# Patient Record
Sex: MALE | Race: WHITE | NOT HISPANIC OR LATINO | Employment: OTHER | URBAN - METROPOLITAN AREA
[De-identification: names, ages, dates, MRNs, and addresses within clinical notes are randomized per-mention and may not be internally consistent; named-entity substitution may affect disease eponyms.]

---

## 2017-10-26 DIAGNOSIS — E78.5 HYPERLIPIDEMIA LDL GOAL <130: ICD-10-CM

## 2017-10-31 RX ORDER — ATORVASTATIN CALCIUM 10 MG/1
TABLET, FILM COATED ORAL
Qty: 90 TABLET | Refills: 0 | OUTPATIENT
Start: 2017-10-31

## 2017-10-31 NOTE — TELEPHONE ENCOUNTER
Called pt-asked if he still comes to [facility], pt stated no he goes to a doc/clinic in [place].    Updated chart

## 2018-07-25 ENCOUNTER — OFFICE VISIT (OUTPATIENT)
Dept: UROLOGY | Facility: CLINIC | Age: 63
End: 2018-07-25
Payer: COMMERCIAL

## 2018-07-25 ENCOUNTER — MEDICAL CORRESPONDENCE (OUTPATIENT)
Dept: HEALTH INFORMATION MANAGEMENT | Facility: CLINIC | Age: 63
End: 2018-07-25

## 2018-07-25 VITALS
HEIGHT: 69 IN | WEIGHT: 200 LBS | DIASTOLIC BLOOD PRESSURE: 78 MMHG | BODY MASS INDEX: 29.62 KG/M2 | SYSTOLIC BLOOD PRESSURE: 141 MMHG

## 2018-07-25 DIAGNOSIS — N52.03 COMBINED ARTERIAL INSUFFICIENCY AND CORPORO-VENOUS OCCLUSIVE ERECTILE DYSFUNCTION: Primary | ICD-10-CM

## 2018-07-25 PROCEDURE — 99213 OFFICE O/P EST LOW 20 MIN: CPT | Performed by: UROLOGY

## 2018-07-25 ASSESSMENT — PAIN SCALES - GENERAL: PAINLEVEL: NO PAIN (0)

## 2018-07-25 NOTE — LETTER
7/25/2018       RE: Carlos Kingston  [address]  [facility] [address]     Dear Colleague,    Thank you for referring your patient, Carlos Kingston, to the [facility] [place] at [facility]. Please see a copy of my visit note below.    Carlos Kingston is a 63-year-old male with erectile dysfunction who I have seen several times in the past. It's been 2 years since I last saw him and he started injection therapy. He is managing with 0.4 cc injection and an erect aid ring to manage his venous leak.  We had a long discussion about penile prosthetics-Zenia's wife are not ready to consider this as an option  Other past medical history: No prostate cancer or family history of prostate cancer. No voiding issues. Arthritis, nonsmoker  Medications: Excedrin tension headache, Lipitor, Prilosec  Allergies: None  Exam: Alert and oriented, normocephalic, normal respirations, neuro grossly intact. Normal sphincter tone, no rectal mass or impaction, benign feeling prostate, normal seminal vesicles  Assessment: Erectile dysfunction  Plan: Renew tri-mix prescription            Yearly digital rectal exam             He may be a good candidate for penile implant in the future    Again, thank you for allowing me to participate in the care of your patient.      Sincerely,    Elliott Rocha MD

## 2018-07-25 NOTE — MR AVS SNAPSHOT
"              After Visit Summary   7/25/2018    Carlos Kingston    MRN: [id number]           Patient Information     Date Of Birth          1955        Visit Information        Provider Department      7/25/2018 9:30 AM Elliott Rocha MD [facility] [place]        Today's Diagnoses     Combined arterial insufficiency and corporo-venous occlusive erectile dysfunction    -  1       Follow-ups after your visit        Who to contact     If you have questions or need follow up information about today's clinic visit or your schedule please contact [facility] [place] directly at [phone].  Normal or non-critical lab and imaging results will be communicated to you by MyChart, letter or phone within 4 business days after the clinic has received the results. If you do not hear from us within 7 days, please contact the clinic through MyChart or phone. If you have a critical or abnormal lab result, we will notify you by phone as soon as possible.  Submit refill requests through [employer] or call your pharmacy and they will forward the refill request to us. Please allow 3 business days for your refill to be completed.          Additional Information About Your Visit        Care EveryWhere ID     This is your Care EveryWhere ID. This could be used by other organizations to access your [place] medical records  [id number]        Your Vitals Were     Height BMI (Body Mass Index)                1.753 m (5' 9\") 29.53 kg/m2           Blood Pressure from Last 3 Encounters:   07/25/18 141/78   12/02/16 139/83   10/21/16 107/75    Weight from Last 3 Encounters:   07/25/18 90.7 kg (200 lb)   10/21/16 87.1 kg (192 lb)   02/02/16 93.9 kg (207 lb)              Today, you had the following     No orders found for display       Primary Care Provider Fax #    Physician No Ref-Primary [phone]       No address on file        Equal Access to Services     DARRICK " GAAR : Hadii cr hassan sharda Hawkins, waaxda luqadaha, qaybta kaalmada dcabbiejesenia, morgan devries haylindsay caraballomaximwallace iqbal. So [facility] [phone].    ATENCIÓN: Si habla español, tiene a cardozo disposición servicios gratuitos de asistencia lingüística. Llame al [phone].    We comply with applicable federal civil rights laws and [place] laws. We do not discriminate on the basis of race, color, national origin, age, disability, sex, sexual orientation, or gender identity.            Thank you!     Thank you for choosing [facility] [place]  for your care. Our goal is always to provide you with excellent care. Hearing back from our patients is one way we can continue to improve our services. Please take a few minutes to complete the written survey that you may receive in the mail after your visit with us. Thank you!             Your Updated Medication List - Protect others around you: Learn how to safely use, store and throw away your medicines at www.disposemymeds.org.          This list is accurate as of 7/25/18  9:46 AM.  Always use your most recent med list.                   Brand Name Dispense Instructions for use Diagnosis    acetaminophen-caffeine 500-65 MG Tabs    EXCEDRIN TENSION HEADACHE     Take 2 tablets by mouth every 6 hours as needed for mild pain        atorvastatin 10 MG tablet    LIPITOR    90 tablet    Take 1 tablet (10 mg) by mouth daily    Hyperlipidemia LDL goal <130       omeprazole 20 MG CR capsule    priLOSEC    30 capsule    Take 1 capsule (20 mg) by mouth daily    Gastroesophageal reflux disease without esophagitis

## 2018-07-25 NOTE — PROGRESS NOTES
Carlos Kingston is a 63-year-old male with erectile dysfunction who I have seen several times in the past. It's been 2 years since I last saw him and he started injection therapy. He is managing with 0.4 cc injection and an erect aid ring to manage his venous leak.  We had a long discussion about penile prosthetics-Zenia's wife are not ready to consider this as an option  Other past medical history: No prostate cancer or family history of prostate cancer. No voiding issues. Arthritis, nonsmoker  Medications: Excedrin tension headache, Lipitor, Prilosec  Allergies: None  Exam: Alert and oriented, normocephalic, normal respirations, neuro grossly intact. Normal sphincter tone, no rectal mass or impaction, benign feeling prostate, normal seminal vesicles  Assessment: Erectile dysfunction  Plan: Renew tri-mix prescription            Yearly digital rectal exam             He may be a good candidate for penile implant in the future

## 2018-07-25 NOTE — LETTER
Date:July 26, 2018      Patient was self referred, no letter generated. Do not send.        [facility] Health Information

## 2019-04-08 ASSESSMENT — MIFFLIN-ST. JEOR: SCORE: 1600.47

## 2019-04-18 ENCOUNTER — HOSPITAL ENCOUNTER (OUTPATIENT)
Dept: LAB | Facility: CLINIC | Age: 64
Discharge: HOME | End: 2019-04-18
Attending: ORTHOPAEDIC SURGERY | Admitting: ORTHOPAEDIC SURGERY

## 2019-04-18 DIAGNOSIS — Z01.812 PRE-OPERATIVE LABORATORY EXAMINATION: ICD-10-CM

## 2019-04-18 LAB
MRSA DNA SPEC QL NAA+PROBE: POSITIVE
SPECIMEN SOURCE: ABNORMAL

## 2019-04-18 PROCEDURE — 40000829 ZZHCL STATISTIC STAPH AUREUS SUSCEPT SCREEN PCR: Performed by: ORTHOPAEDIC SURGERY

## 2019-04-18 PROCEDURE — 87641 MR-STAPH DNA AMP PROBE: CPT | Performed by: ORTHOPAEDIC SURGERY

## 2019-04-18 PROCEDURE — 87640 STAPH A DNA AMP PROBE: CPT | Performed by: ORTHOPAEDIC SURGERY

## 2019-04-18 PROCEDURE — 40000830 ZZHCL STATISTIC STAPH AUREUS METH RESIST SCREEN PCR: Performed by: ORTHOPAEDIC SURGERY

## 2019-04-19 RX ORDER — MUPIROCIN 20 MG/G
OINTMENT TOPICAL 2 TIMES DAILY
Status: ON HOLD | COMMUNITY
Start: 2019-04-29 | End: 2019-05-09

## 2019-04-19 RX ORDER — CEFAZOLIN SODIUM 1 G/3ML
1 INJECTION, POWDER, FOR SOLUTION INTRAMUSCULAR; INTRAVENOUS SEE ADMIN INSTRUCTIONS
Status: CANCELLED | OUTPATIENT
Start: 2019-04-19

## 2019-04-19 RX ORDER — CEFAZOLIN SODIUM 2 G/100ML
2 INJECTION, SOLUTION INTRAVENOUS
Status: CANCELLED | OUTPATIENT
Start: 2019-04-19

## 2019-04-19 NOTE — OR NURSING
Nasal screen MRSA positive.  Pt notified.  Rx for mupiricin called to pt's preferred pharmacy.  Reviewed instructions with pt for mupiricin as well as extra showers with exidine for 5 days prior to surgery.  Pt notified he will be in contact isolation while in hospital and receive Vanco preop.  Dr. Gonzalez's office notified of above.

## 2019-05-01 NOTE — PHARMACY-ADMISSION MEDICATION HISTORY
Medication reconciliation interview completed by pre-admitting nurse, reviewed by pharmacy. No further clarifications needed.     Last Reviewed by Maliha Mckenna RN on 4/8/2019     Prior to Admission medications    Medication Sig Last Dose Taking? Auth Provider   acetaminophen-caffeine (EXCEDRIN TENSION HEADACHE) 500-65 MG TABS Take 2 tablets by mouth every 6 hours as needed for mild pain  Yes Reported, Patient   atorvastatin (LIPITOR) 10 MG tablet Take 1 tablet (10 mg) by mouth daily  Yes Trudy King MD   mupirocin (BACTROBAN) 2 % external ointment 2 times daily Apply a small amount in each nostril 2 times per day for 7 days prior to surgery.  Yes Reported, Patient

## 2019-05-06 ENCOUNTER — APPOINTMENT (OUTPATIENT)
Dept: PHYSICAL THERAPY | Facility: CLINIC | Age: 64
DRG: 462 | End: 2019-05-06
Attending: ORTHOPAEDIC SURGERY
Payer: MEDICARE

## 2019-05-06 ENCOUNTER — ANESTHESIA EVENT (OUTPATIENT)
Dept: SURGERY | Facility: CLINIC | Age: 64
DRG: 462 | End: 2019-05-06
Payer: MEDICARE

## 2019-05-06 ENCOUNTER — HOSPITAL ENCOUNTER (INPATIENT)
Facility: CLINIC | Age: 64
LOS: 3 days | Discharge: HOME | DRG: 462 | End: 2019-05-09
Attending: ORTHOPAEDIC SURGERY | Admitting: ORTHOPAEDIC SURGERY
Payer: MEDICARE

## 2019-05-06 ENCOUNTER — APPOINTMENT (OUTPATIENT)
Dept: GENERAL RADIOLOGY | Facility: CLINIC | Age: 64
DRG: 462 | End: 2019-05-06
Attending: ORTHOPAEDIC SURGERY
Payer: MEDICARE

## 2019-05-06 ENCOUNTER — ANESTHESIA (OUTPATIENT)
Dept: SURGERY | Facility: CLINIC | Age: 64
DRG: 462 | End: 2019-05-06
Payer: MEDICARE

## 2019-05-06 DIAGNOSIS — Z96.653 STATUS POST TOTAL BILATERAL KNEE REPLACEMENT: Primary | ICD-10-CM

## 2019-05-06 PROCEDURE — 36000093 ZZH SURGERY LEVEL 4 1ST 30 MIN: Performed by: ORTHOPAEDIC SURGERY

## 2019-05-06 PROCEDURE — 25800030 ZZH RX IP 258 OP 636: Performed by: ORTHOPAEDIC SURGERY

## 2019-05-06 PROCEDURE — 27810169 ZZH OR IMPLANT GENERAL: Performed by: ORTHOPAEDIC SURGERY

## 2019-05-06 PROCEDURE — 37000008 ZZH ANESTHESIA TECHNICAL FEE, 1ST 30 MIN: Performed by: ORTHOPAEDIC SURGERY

## 2019-05-06 PROCEDURE — 0SRD0J9 REPLACEMENT OF LEFT KNEE JOINT WITH SYNTHETIC SUBSTITUTE, CEMENTED, OPEN APPROACH: ICD-10-PCS | Performed by: ORTHOPAEDIC SURGERY

## 2019-05-06 PROCEDURE — 97162 PT EVAL MOD COMPLEX 30 MIN: CPT | Mod: GP | Performed by: PHYSICAL THERAPIST

## 2019-05-06 PROCEDURE — 37000009 ZZH ANESTHESIA TECHNICAL FEE, EACH ADDTL 15 MIN: Performed by: ORTHOPAEDIC SURGERY

## 2019-05-06 PROCEDURE — 27210794 ZZH OR GENERAL SUPPLY STERILE: Performed by: ORTHOPAEDIC SURGERY

## 2019-05-06 PROCEDURE — C1713 ANCHOR/SCREW BN/BN,TIS/BN: HCPCS | Performed by: ORTHOPAEDIC SURGERY

## 2019-05-06 PROCEDURE — 25000128 H RX IP 250 OP 636: Performed by: ANESTHESIOLOGY

## 2019-05-06 PROCEDURE — 25000128 H RX IP 250 OP 636: Performed by: ORTHOPAEDIC SURGERY

## 2019-05-06 PROCEDURE — 25000132 ZZH RX MED GY IP 250 OP 250 PS 637: Performed by: ORTHOPAEDIC SURGERY

## 2019-05-06 PROCEDURE — A9270 NON-COVERED ITEM OR SERVICE: HCPCS | Performed by: ORTHOPAEDIC SURGERY

## 2019-05-06 PROCEDURE — C1776 JOINT DEVICE (IMPLANTABLE): HCPCS | Performed by: ORTHOPAEDIC SURGERY

## 2019-05-06 PROCEDURE — 97116 GAIT TRAINING THERAPY: CPT | Mod: GP | Performed by: PHYSICAL THERAPIST

## 2019-05-06 PROCEDURE — 25000125 ZZHC RX 250: Performed by: NURSE ANESTHETIST, CERTIFIED REGISTERED

## 2019-05-06 PROCEDURE — 97530 THERAPEUTIC ACTIVITIES: CPT | Mod: GP | Performed by: PHYSICAL THERAPIST

## 2019-05-06 PROCEDURE — 36000063 ZZH SURGERY LEVEL 4 EA 15 ADDTL MIN: Performed by: ORTHOPAEDIC SURGERY

## 2019-05-06 PROCEDURE — 25800030 ZZH RX IP 258 OP 636: Performed by: ANESTHESIOLOGY

## 2019-05-06 PROCEDURE — 71000014 ZZH RECOVERY PHASE 1 LEVEL 2 FIRST HR: Performed by: ORTHOPAEDIC SURGERY

## 2019-05-06 PROCEDURE — 12000000 ZZH R&B MED SURG/OB

## 2019-05-06 PROCEDURE — 0SRC0J9 REPLACEMENT OF RIGHT KNEE JOINT WITH SYNTHETIC SUBSTITUTE, CEMENTED, OPEN APPROACH: ICD-10-PCS | Performed by: ORTHOPAEDIC SURGERY

## 2019-05-06 PROCEDURE — 25800030 ZZH RX IP 258 OP 636: Performed by: NURSE ANESTHETIST, CERTIFIED REGISTERED

## 2019-05-06 PROCEDURE — 40000986 XR KNEE PORT BILATERAL 1/2 VW: Mod: 50

## 2019-05-06 PROCEDURE — 25000128 H RX IP 250 OP 636: Performed by: NURSE ANESTHETIST, CERTIFIED REGISTERED

## 2019-05-06 PROCEDURE — 40000306 ZZH STATISTIC PRE PROC ASSESS II: Performed by: ORTHOPAEDIC SURGERY

## 2019-05-06 PROCEDURE — 25800025 ZZH RX 258: Performed by: ORTHOPAEDIC SURGERY

## 2019-05-06 PROCEDURE — 97110 THERAPEUTIC EXERCISES: CPT | Mod: GP | Performed by: PHYSICAL THERAPIST

## 2019-05-06 DEVICE — BONE CEMENT STRK SIMPLEX P SPEEDSET 6192-1-001: Type: IMPLANTABLE DEVICE | Site: KNEE | Status: FUNCTIONAL

## 2019-05-06 DEVICE — IMP COMP PATELLA GENESIS II 9X35MM 71420578: Type: IMPLANTABLE DEVICE | Site: KNEE | Status: FUNCTIONAL

## 2019-05-06 DEVICE — IMP COMP TIBIAL TRAY SNN JOURNEY NP RT SZ 5 74022215: Type: IMPLANTABLE DEVICE | Site: KNEE | Status: FUNCTIONAL

## 2019-05-06 DEVICE — IMPLANTABLE DEVICE: Type: IMPLANTABLE DEVICE | Site: KNEE | Status: FUNCTIONAL

## 2019-05-06 RX ORDER — HYDROMORPHONE HYDROCHLORIDE 1 MG/ML
.3-.5 INJECTION, SOLUTION INTRAMUSCULAR; INTRAVENOUS; SUBCUTANEOUS
Status: DISCONTINUED | OUTPATIENT
Start: 2019-05-06 | End: 2019-05-09 | Stop reason: HOSPADM

## 2019-05-06 RX ORDER — GABAPENTIN 100 MG/1
100 CAPSULE ORAL 3 TIMES DAILY
Status: DISCONTINUED | OUTPATIENT
Start: 2019-05-06 | End: 2019-05-09 | Stop reason: HOSPADM

## 2019-05-06 RX ORDER — NALOXONE HYDROCHLORIDE 0.4 MG/ML
.1-.4 INJECTION, SOLUTION INTRAMUSCULAR; INTRAVENOUS; SUBCUTANEOUS
Status: DISCONTINUED | OUTPATIENT
Start: 2019-05-06 | End: 2019-05-09 | Stop reason: HOSPADM

## 2019-05-06 RX ORDER — CEFAZOLIN SODIUM 2 G/100ML
2 INJECTION, SOLUTION INTRAVENOUS
Status: COMPLETED | OUTPATIENT
Start: 2019-05-06 | End: 2019-05-06

## 2019-05-06 RX ORDER — HYDROMORPHONE HYDROCHLORIDE 1 MG/ML
.3-.5 INJECTION, SOLUTION INTRAMUSCULAR; INTRAVENOUS; SUBCUTANEOUS EVERY 5 MIN PRN
Status: DISCONTINUED | OUTPATIENT
Start: 2019-05-06 | End: 2019-05-06 | Stop reason: HOSPADM

## 2019-05-06 RX ORDER — PROPOFOL 10 MG/ML
INJECTION, EMULSION INTRAVENOUS PRN
Status: DISCONTINUED | OUTPATIENT
Start: 2019-05-06 | End: 2019-05-06

## 2019-05-06 RX ORDER — FENTANYL CITRATE 50 UG/ML
25-50 INJECTION, SOLUTION INTRAMUSCULAR; INTRAVENOUS
Status: DISCONTINUED | OUTPATIENT
Start: 2019-05-06 | End: 2019-05-06 | Stop reason: HOSPADM

## 2019-05-06 RX ORDER — ATORVASTATIN CALCIUM 10 MG/1
10 TABLET, FILM COATED ORAL DAILY
Status: DISCONTINUED | OUTPATIENT
Start: 2019-05-06 | End: 2019-05-09 | Stop reason: HOSPADM

## 2019-05-06 RX ORDER — NALOXONE HYDROCHLORIDE 0.4 MG/ML
.1-.4 INJECTION, SOLUTION INTRAMUSCULAR; INTRAVENOUS; SUBCUTANEOUS
Status: ACTIVE | OUTPATIENT
Start: 2019-05-06 | End: 2019-05-07

## 2019-05-06 RX ORDER — CEFAZOLIN SODIUM 2 G/100ML
2 INJECTION, SOLUTION INTRAVENOUS EVERY 8 HOURS
Status: COMPLETED | OUTPATIENT
Start: 2019-05-06 | End: 2019-05-07

## 2019-05-06 RX ORDER — CEFAZOLIN SODIUM 1 G/3ML
1 INJECTION, POWDER, FOR SOLUTION INTRAMUSCULAR; INTRAVENOUS SEE ADMIN INSTRUCTIONS
Status: DISCONTINUED | OUTPATIENT
Start: 2019-05-06 | End: 2019-05-06 | Stop reason: HOSPADM

## 2019-05-06 RX ORDER — METOCLOPRAMIDE HYDROCHLORIDE 5 MG/ML
5 INJECTION INTRAMUSCULAR; INTRAVENOUS EVERY 6 HOURS PRN
Status: DISCONTINUED | OUTPATIENT
Start: 2019-05-06 | End: 2019-05-09 | Stop reason: HOSPADM

## 2019-05-06 RX ORDER — FENTANYL CITRATE 50 UG/ML
INJECTION, SOLUTION INTRAMUSCULAR; INTRAVENOUS PRN
Status: DISCONTINUED | OUTPATIENT
Start: 2019-05-06 | End: 2019-05-06

## 2019-05-06 RX ORDER — ALBUTEROL SULFATE 0.83 MG/ML
2.5 SOLUTION RESPIRATORY (INHALATION) EVERY 4 HOURS PRN
Status: DISCONTINUED | OUTPATIENT
Start: 2019-05-06 | End: 2019-05-06 | Stop reason: HOSPADM

## 2019-05-06 RX ORDER — DIMENHYDRINATE 50 MG/ML
25 INJECTION, SOLUTION INTRAMUSCULAR; INTRAVENOUS
Status: DISCONTINUED | OUTPATIENT
Start: 2019-05-06 | End: 2019-05-06 | Stop reason: HOSPADM

## 2019-05-06 RX ORDER — LABETALOL 20 MG/4 ML (5 MG/ML) INTRAVENOUS SYRINGE
10
Status: DISCONTINUED | OUTPATIENT
Start: 2019-05-06 | End: 2019-05-06 | Stop reason: HOSPADM

## 2019-05-06 RX ORDER — LIDOCAINE 40 MG/G
CREAM TOPICAL
Status: DISCONTINUED | OUTPATIENT
Start: 2019-05-06 | End: 2019-05-09 | Stop reason: HOSPADM

## 2019-05-06 RX ORDER — EPHEDRINE SULFATE 50 MG/ML
INJECTION, SOLUTION INTRAMUSCULAR; INTRAVENOUS; SUBCUTANEOUS PRN
Status: DISCONTINUED | OUTPATIENT
Start: 2019-05-06 | End: 2019-05-06

## 2019-05-06 RX ORDER — PROPOFOL 10 MG/ML
INJECTION, EMULSION INTRAVENOUS CONTINUOUS PRN
Status: DISCONTINUED | OUTPATIENT
Start: 2019-05-06 | End: 2019-05-06

## 2019-05-06 RX ORDER — SODIUM CHLORIDE, SODIUM LACTATE, POTASSIUM CHLORIDE, CALCIUM CHLORIDE 600; 310; 30; 20 MG/100ML; MG/100ML; MG/100ML; MG/100ML
INJECTION, SOLUTION INTRAVENOUS CONTINUOUS
Status: DISCONTINUED | OUTPATIENT
Start: 2019-05-06 | End: 2019-05-06 | Stop reason: HOSPADM

## 2019-05-06 RX ORDER — ONDANSETRON 2 MG/ML
4 INJECTION INTRAMUSCULAR; INTRAVENOUS EVERY 6 HOURS PRN
Status: DISCONTINUED | OUTPATIENT
Start: 2019-05-06 | End: 2019-05-09 | Stop reason: HOSPADM

## 2019-05-06 RX ORDER — MORPHINE SULFATE 4 MG/ML
INJECTION, SOLUTION INTRAMUSCULAR; INTRAVENOUS PRN
Status: DISCONTINUED | OUTPATIENT
Start: 2019-05-06 | End: 2019-05-06

## 2019-05-06 RX ORDER — DIAZEPAM 10 MG/2ML
2.5 INJECTION, SOLUTION INTRAMUSCULAR; INTRAVENOUS
Status: DISCONTINUED | OUTPATIENT
Start: 2019-05-06 | End: 2019-05-06 | Stop reason: HOSPADM

## 2019-05-06 RX ORDER — AMOXICILLIN 250 MG
2 CAPSULE ORAL 2 TIMES DAILY
Status: DISCONTINUED | OUTPATIENT
Start: 2019-05-06 | End: 2019-05-09 | Stop reason: HOSPADM

## 2019-05-06 RX ORDER — ONDANSETRON 4 MG/1
4 TABLET, ORALLY DISINTEGRATING ORAL EVERY 6 HOURS PRN
Status: DISCONTINUED | OUTPATIENT
Start: 2019-05-06 | End: 2019-05-09 | Stop reason: HOSPADM

## 2019-05-06 RX ORDER — MEPERIDINE HYDROCHLORIDE 50 MG/ML
12.5 INJECTION INTRAMUSCULAR; INTRAVENOUS; SUBCUTANEOUS EVERY 5 MIN PRN
Status: DISCONTINUED | OUTPATIENT
Start: 2019-05-06 | End: 2019-05-06 | Stop reason: HOSPADM

## 2019-05-06 RX ORDER — ONDANSETRON 4 MG/1
4 TABLET, ORALLY DISINTEGRATING ORAL EVERY 30 MIN PRN
Status: DISCONTINUED | OUTPATIENT
Start: 2019-05-06 | End: 2019-05-06 | Stop reason: HOSPADM

## 2019-05-06 RX ORDER — OXYCODONE HYDROCHLORIDE 5 MG/1
5-10 TABLET ORAL EVERY 4 HOURS PRN
Status: DISCONTINUED | OUTPATIENT
Start: 2019-05-06 | End: 2019-05-09 | Stop reason: HOSPADM

## 2019-05-06 RX ORDER — ACETAMINOPHEN 325 MG/1
975 TABLET ORAL EVERY 8 HOURS
Status: DISCONTINUED | OUTPATIENT
Start: 2019-05-06 | End: 2019-05-09 | Stop reason: HOSPADM

## 2019-05-06 RX ORDER — ACETAMINOPHEN 325 MG/1
650 TABLET ORAL EVERY 4 HOURS PRN
Status: DISCONTINUED | OUTPATIENT
Start: 2019-05-09 | End: 2019-05-09 | Stop reason: HOSPADM

## 2019-05-06 RX ORDER — AMOXICILLIN 250 MG
1 CAPSULE ORAL 2 TIMES DAILY
Status: DISCONTINUED | OUTPATIENT
Start: 2019-05-06 | End: 2019-05-09 | Stop reason: HOSPADM

## 2019-05-06 RX ORDER — LIDOCAINE 40 MG/G
CREAM TOPICAL
Status: DISCONTINUED | OUTPATIENT
Start: 2019-05-06 | End: 2019-05-06 | Stop reason: HOSPADM

## 2019-05-06 RX ORDER — SODIUM CHLORIDE 9 MG/ML
INJECTION, SOLUTION INTRAVENOUS CONTINUOUS
Status: DISCONTINUED | OUTPATIENT
Start: 2019-05-06 | End: 2019-05-09 | Stop reason: HOSPADM

## 2019-05-06 RX ORDER — HYDRALAZINE HYDROCHLORIDE 20 MG/ML
2.5-5 INJECTION INTRAMUSCULAR; INTRAVENOUS EVERY 10 MIN PRN
Status: DISCONTINUED | OUTPATIENT
Start: 2019-05-06 | End: 2019-05-06 | Stop reason: HOSPADM

## 2019-05-06 RX ORDER — BUPIVACAINE HYDROCHLORIDE 7.5 MG/ML
INJECTION, SOLUTION INTRASPINAL PRN
Status: DISCONTINUED | OUTPATIENT
Start: 2019-05-06 | End: 2019-05-06

## 2019-05-06 RX ORDER — ONDANSETRON 2 MG/ML
4 INJECTION INTRAMUSCULAR; INTRAVENOUS EVERY 30 MIN PRN
Status: DISCONTINUED | OUTPATIENT
Start: 2019-05-06 | End: 2019-05-06 | Stop reason: HOSPADM

## 2019-05-06 RX ORDER — NALOXONE HYDROCHLORIDE 0.4 MG/ML
.1-.4 INJECTION, SOLUTION INTRAMUSCULAR; INTRAVENOUS; SUBCUTANEOUS
Status: DISCONTINUED | OUTPATIENT
Start: 2019-05-06 | End: 2019-05-06

## 2019-05-06 RX ADMIN — SODIUM CHLORIDE, POTASSIUM CHLORIDE, SODIUM LACTATE AND CALCIUM CHLORIDE: 600; 310; 30; 20 INJECTION, SOLUTION INTRAVENOUS at 07:34

## 2019-05-06 RX ADMIN — SODIUM CHLORIDE, POTASSIUM CHLORIDE, SODIUM LACTATE AND CALCIUM CHLORIDE: 600; 310; 30; 20 INJECTION, SOLUTION INTRAVENOUS at 07:53

## 2019-05-06 RX ADMIN — OXYCODONE HYDROCHLORIDE 10 MG: 5 TABLET ORAL at 17:03

## 2019-05-06 RX ADMIN — OXYCODONE HYDROCHLORIDE 10 MG: 5 TABLET ORAL at 20:45

## 2019-05-06 RX ADMIN — CEFAZOLIN SODIUM 2 G: 2 INJECTION, SOLUTION INTRAVENOUS at 07:34

## 2019-05-06 RX ADMIN — BUPIVACAINE HYDROCHLORIDE IN DEXTROSE 15 MG: 7.5 INJECTION, SOLUTION SUBARACHNOID at 07:04

## 2019-05-06 RX ADMIN — PROPOFOL 200 MG: 10 INJECTION, EMULSION INTRAVENOUS at 07:49

## 2019-05-06 RX ADMIN — CEFAZOLIN 1 G: 1 INJECTION, POWDER, FOR SOLUTION INTRAMUSCULAR; INTRAVENOUS at 09:34

## 2019-05-06 RX ADMIN — VANCOMYCIN HYDROCHLORIDE 1500 MG: 5 INJECTION, POWDER, LYOPHILIZED, FOR SOLUTION INTRAVENOUS at 06:28

## 2019-05-06 RX ADMIN — CEFAZOLIN SODIUM 2 G: 2 INJECTION, SOLUTION INTRAVENOUS at 17:05

## 2019-05-06 RX ADMIN — FENTANYL CITRATE 100 MCG: 50 INJECTION, SOLUTION INTRAMUSCULAR; INTRAVENOUS at 07:04

## 2019-05-06 RX ADMIN — PHENYLEPHRINE HYDROCHLORIDE 100 MCG: 10 INJECTION, SOLUTION INTRAMUSCULAR; INTRAVENOUS; SUBCUTANEOUS at 08:16

## 2019-05-06 RX ADMIN — MIDAZOLAM 2 MG: 1 INJECTION INTRAMUSCULAR; INTRAVENOUS at 07:04

## 2019-05-06 RX ADMIN — SODIUM CHLORIDE: 9 INJECTION, SOLUTION INTRAVENOUS at 11:38

## 2019-05-06 RX ADMIN — PHENYLEPHRINE HYDROCHLORIDE 100 MCG: 10 INJECTION, SOLUTION INTRAMUSCULAR; INTRAVENOUS; SUBCUTANEOUS at 09:10

## 2019-05-06 RX ADMIN — ONDANSETRON 4 MG: 2 INJECTION INTRAMUSCULAR; INTRAVENOUS at 11:38

## 2019-05-06 RX ADMIN — Medication 5 MG: at 07:43

## 2019-05-06 RX ADMIN — Medication 0.5 MG: at 14:20

## 2019-05-06 RX ADMIN — Medication 0.5 MG: at 12:26

## 2019-05-06 RX ADMIN — PROPOFOL 65 MCG/KG/MIN: 10 INJECTION, EMULSION INTRAVENOUS at 07:49

## 2019-05-06 RX ADMIN — MORPHINE SULFATE 0.4 MG: 4 INJECTION, SOLUTION INTRAMUSCULAR; INTRAVENOUS at 07:04

## 2019-05-06 RX ADMIN — GABAPENTIN 100 MG: 100 CAPSULE ORAL at 20:44

## 2019-05-06 RX ADMIN — SENNOSIDES AND DOCUSATE SODIUM 1 TABLET: 8.6; 5 TABLET ORAL at 20:44

## 2019-05-06 RX ADMIN — PROCHLORPERAZINE EDISYLATE 5 MG: 5 INJECTION INTRAMUSCULAR; INTRAVENOUS at 14:25

## 2019-05-06 RX ADMIN — ACETAMINOPHEN 975 MG: 325 TABLET, FILM COATED ORAL at 20:44

## 2019-05-06 ASSESSMENT — ACTIVITIES OF DAILY LIVING (ADL)
SWALLOWING: 0-->SWALLOWS FOODS/LIQUIDS WITHOUT DIFFICULTY
RETIRED_EATING: 0-->INDEPENDENT
AMBULATION: 0-->INDEPENDENT
COGNITION: 0 - NO COGNITION ISSUES REPORTED
FALL_HISTORY_WITHIN_LAST_SIX_MONTHS: NO
TOILETING: 0-->INDEPENDENT
TRANSFERRING: 0-->INDEPENDENT
DRESS: 0-->INDEPENDENT
ADLS_ACUITY_SCORE: 12
BATHING: 0-->INDEPENDENT
RETIRED_COMMUNICATION: 0-->UNDERSTANDS/COMMUNICATES WITHOUT DIFFICULTY
ADLS_ACUITY_SCORE: 10
ADLS_ACUITY_SCORE: 10

## 2019-05-06 ASSESSMENT — MIFFLIN-ST. JEOR: SCORE: 1600.47

## 2019-05-06 NOTE — BRIEF OP NOTE
[facility]    Brief Operative Note    Pre-operative diagnosis: DJD  Post-operative diagnosis * No post-op diagnosis entered *  Procedure: Procedure(s):  Bilateral total knee arthroplasty  Surgeon: Surgeon(s) and Role:     * Elliott Gonzalez MD - Primary     * David Martinez - Assisting  Anesthesia: General   Estimated blood loss: Less than 50 ml  Drains: None  Specimens: * No specimens in log *  Findings:   None.  Complications: None.  Implants:    Implant Name Type Inv. Item Serial No.  Lot No. LRB No. Used   BONE CEMENT RADIOPAQUE SIMPLEX P SPEEDSET 6192-1-001 Cement, Bone BONE CEMENT RADIOPAQUE SIMPLEX P SPEEDSET 6192-1-001  [employer] [id number] Right 2   BONE CEMENT RADIOPAQUE SIMPLEX P SPEEDSET 6192-1-001 Cement, Bone BONE CEMENT RADIOPAQUE SIMPLEX P SPEEDSET 6192-1-001  [employer] [id number] Left 2   JOURNEY  Tibial Baseplate, Size 5, Left    RG &amp; NEPHEW [id number] Left 1   IMP COMP PATELLA INOCENCIO II 9X35MM [id number] Total Joint Component/Insert IMP COMP PATELLA INOCENCIO II 9X35MM [id number]  [place]  [id number] Left 1   Size 5, Left, Bi-Cruciate Stabilized, Journey  II, BCS, Oxinium, Femoral Component    RG &amp; NEPHEW [id number] Left 1   Left, Size 5-6, 11MM, Journey II BCS A/P 52MM, M/L 74MM - Articular Insert    RG &amp; NEPHEW [id number] Left 1   IMP COMP PATELLA INOCENCIO II 9X35MM [id number] Total Joint Component/Insert IMP COMP PATELLA INOCENCIO II 9X35MM [id number]  [place]  [id number] Right 1   IMP COMP TIBIAL TRAY SNN JOURNEY NP RT SZ 5 Total Joint Component/Insert IMP COMP TIBIAL TRAY SNN JOURNEY NP RT SZ 5  [employer]  [id number] Right 1   Size 5, Right, Bi-Criciate Stabilized Journey II BCS Femoral Component    RG &amp; NEPHEW [id number] Right 1   Right, Size 5-6, 12MM A/P 52MM, M/L 74MM, Articular Insert    RG &amp; NEPHEW [id number] Right 1

## 2019-05-06 NOTE — ANESTHESIA PREPROCEDURE EVALUATION
Anesthesia Pre-Procedure Evaluation    Patient: Carlos Kingston   MRN: [id number] : 1955          Preoperative Diagnosis: DJD    Procedure(s):  Bilateral total knee arthroplasty    Past Medical History:   Diagnosis Date     Arthritis     generalized     Migraines      Past Surgical History:   Procedure Laterality Date     ANKLE SURGERY Right     Debridement     ARTHROPLASTY HIP Right 2014    Procedure: ARTHROPLASTY HIP;  Surgeon: Elliott Gonzalez MD;  Location: [facility] OR     ARTHROSCOPY KNEE Right      ARTHROSCOPY KNEE WITH MEDIAL MENISCECTOMY  2013    Procedure: ARTHROSCOPY KNEE WITH MEDIAL MENISCECTOMY;  RIGHT ARTHROSCOPY KNEE WITH MEDIAL MENISCECTOMY;  Surgeon: Elliott Gonzalez MD;  Location: [facility] OR     C TOTAL HIP ARTHROPLASTY Left      CARPAL TUNNEL RELEASE RT/LT Bilateral      DISCECTOMY, FUSION CERVICAL ANTERIOR TWO LEVELS, COMBINED N/A 2014    Procedure: COMBINED DISCECTOMY, FUSION CERVICAL ANTERIOR TWO LEVELS;  Surgeon: Mainor Adan MD;  Location: [facility] OR     ORTHOPEDIC SURGERY      right ankle arthroscopy     ORTHOPEDIC SURGERY      left hip replacment     ORTHOPEDIC SURGERY      bilateral carpal tunnel     SOFT TISSUE SURGERY      fatty lump removed rt. arm     Anesthesia Evaluation     . Pt has had prior anesthetic. Type: General    History of anesthetic complications   - PONV        ROS/MED HX    ENT/Pulmonary:  - neg pulmonary ROS     Neurologic:  - neg neurologic ROS     Cardiovascular:  - neg cardiovascular ROS   (+) ----. : . . . :. . Previous cardiac testing date:results:Stress Testdate: results:Interpretation Summary  The patient exhibited no chest pain during exercise.  The Duke treadmill score was low risk ( >5 Duke score).  The EKG portion of this stress test was negative for inducible ischemia (see  echo results below).  Target Heart Rate was achieved.  This was a normal stress echocardiogram with no evidence of stress-induced  ischemia. date: results: date:  results:          METS/Exercise Tolerance:     Hematologic:  - neg hematologic  ROS       Musculoskeletal:   (+) arthritis,  -       GI/Hepatic:  - neg GI/hepatic ROS       Renal/Genitourinary:  - ROS Renal section negative       Endo: Comment: Class 1 obesity    (+) Obesity, .      Psychiatric:  - neg psychiatric ROS       Infectious Disease:  - neg infectious disease ROS       Malignancy:      - no malignancy   Other:    - neg other ROS                      Physical Exam  Normal systems: cardiovascular, pulmonary and dental    Airway   Mallampati: II  TM distance: >3 FB  Neck ROM: full    Dental     Cardiovascular   Rhythm and rate: regular and normal      Pulmonary    breath sounds clear to auscultation    Other findings: Lab Test        12/31/14 12/30/14 03/29/12                       0633          0535          1355          WBC           --           --          7.8           HGB          11.3*        11.6*        14.9          MCV           --           --          89            PLT           --           --          278            Lab Test        12/01/16 02/02/16 12/31/14                                         1141          0633          NA            --          140           --           POTASSIUM    3.6          4.1           --           CHLORIDE      --          108           --           CO2           --          23            --           BUN           --          19            --           CR           1.06         1.00          --           ANIONGAP      --          9             --           VADIM           --          8.8           --           GLC          123*         93           112*                   ECG  NSR with late ant transition probably normal        Lab Results   Component Value Date    WBC 7.8 03/29/2012    HGB 11.3 (L) 12/31/2014    HCT 42.9 03/29/2012     03/29/2012    CRP <5.0 03/29/2012    SED 4 03/29/2012     02/02/2016    POTASSIUM 3.6 12/01/2016     "CHLORIDE 108 02/02/2016    CO2 23 02/02/2016    BUN 19 02/02/2016    CR 1.06 12/01/2016     (H) 12/01/2016    VADIM 8.8 02/02/2016    ALBUMIN 4.2 08/18/2016    PROTTOTAL 6.7 (L) 08/18/2016    ALT 44 08/18/2016    AST 25 08/18/2016    ALKPHOS 63 08/18/2016    BILITOTAL 0.8 08/18/2016       Preop Vitals  BP Readings from Last 3 Encounters:   05/06/19 (!) 145/94   07/25/18 141/78   12/02/16 139/83    Pulse Readings from Last 3 Encounters:   02/02/16 84      Resp Readings from Last 3 Encounters:   05/06/19 18   12/02/16 18   10/21/16 14    SpO2 Readings from Last 3 Encounters:   05/06/19 98%   12/02/16 93%   10/21/16 95%      Temp Readings from Last 1 Encounters:   05/06/19 97.7  F (36.5  C) (Temporal)    Ht Readings from Last 1 Encounters:   05/06/19 1.651 m (5' 5\")      Wt Readings from Last 1 Encounters:   05/06/19 89.4 kg (197 lb)    Estimated body mass index is 32.78 kg/m  as calculated from the following:    Height as of this encounter: 1.651 m (5' 5\").    Weight as of this encounter: 89.4 kg (197 lb).       Anesthesia Plan      History & Physical Review  History and physical reviewed and following examination; no interval change.    ASA Status:  2 .    NPO Status:  > 8 hours    Plan for General, Spinal and LMA with Intravenous induction. Maintenance will be Balanced.    PONV prophylaxis:  Ondansetron (or other 5HT-3) and Dexamethasone or Solumedrol       Postoperative Care  Postoperative pain management:  IV analgesics, Oral pain medications and Multi-modal analgesia.      Consents  Anesthetic plan, risks, benefits and alternatives discussed with:  Patient.  Use of blood products discussed: No .   .                 Jg Sung MD                    .  "

## 2019-05-06 NOTE — ANESTHESIA PROCEDURE NOTES
Peripheral nerve/Neuraxial procedure note : intrathecal  Pre-Procedure  Performed by Jg Sung MD  Referred by JIM  Location: pre-op      Pre-Anesthestic Checklist: patient identified, IV checked, site marked, risks and benefits discussed, informed consent, monitors and equipment checked, pre-op evaluation, at physician/surgeon's request and post-op pain management    Timeout  Correct Patient: Yes   Correct Procedure: Yes   Correct Site: Yes   Correct Laterality: Yes   Correct Position: Yes   Site Marked: Yes   .   Procedure Documentation    .    Procedure:    Intrathecal.  Insertion Site:L4-5  (midline approach)      Patient Prep;mask, sterile gloves, povidone-iodine 7.5% surgical scrub.  .  Needle: Quincke Spinal Needle (gauge): 22  Spinal/LP Needle Length (inches): 3.5 # of attempts: 1 and # of redirects:  No introducer used .       Assessment/Narrative  Paresthesias: No.  .  .  clear CSF fluid removed . Time Injected: 07:04

## 2019-05-06 NOTE — PLAN OF CARE
Discharge Planner PT PT: PT evaluation completion , treatment initiated for pt POD#0 s/p Bilat TKA due to OA.  Hx: Bilat NITHYA, and L RCT. pt lives w/ spouse in a house with 2 platform steps, once inside he has single level living. 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 No assistive device at baseline, Indep ADL/IADL, drives, active.     Patient plan for discharge: Spouse reports TCU may be needed.  Current status: very sleepy, pain 7/10 or greater. PROM: L knee 95, R knee 90. Indep SLR x 10 bilat, no KI Needed. Tolerated all TKA ex bilat, bed mobility min/mod A w/ HOB elevated, Min A transfers,  ambulated w/ fww no KI's a few paces alongside the bed, light headed /69, 02 sats 90% or greater on 4 L. Back to bed w/ mod A, alarm on, ice packs both knees.   Barriers to return to prior living situation:pt needs to climb 2 platform steps and 1 threshold to get into the home, if they go home , spouse will have to mange the pt on Platform step to/full range of motion OP PT  Recommendations for discharge: TCU at time of eval is recommended. As pt progresses, we will update any changes.  Rationale for recommendations: Pt is a bilat TKA, he has stairs at home. He will benefit form skilled PT at next level of care to progress ROM/strength/functional mobility.        Entered by: Chrissie Alejandre 05/06/2019 5:21 PM

## 2019-05-06 NOTE — OP NOTE
Procedure Date: 05/06/2019      DATE OF PROCEDURE:  05/06/2019      PREOPERATIVE DIAGNOSES:   1.  Degenerative joint disease, left knee.   2.  Degenerative joint disease, right knee.      PREOPERATIVE DIAGNOSES:   1.  Degenerative joint disease, left knee.   2.  Degenerative joint disease, right knee.      PROCEDURES:   1.  Left total knee arthroplasty.   2.  Right total knee arthroplasty.      ANESTHESIA:  General.      SURGEON:  Elliott Gonzalez MD      FIRST ASSISTANT:  David Martinez PA-C.        INDICATIONS:  This 64-year-old man has had chronic increasing problems with bilateral knee pain, and has failed a nonoperative treatment program.  Appropriate risks and alternatives were discussed at length, and he has elected to proceed with surgical intervention.      DESCRIPTION OF PROCEDURE:  The patient was brought to the operating room, given a general anesthetic, both knees prepped and draped sterilely in the usual manner.  We began with the left knee.  Under tourniquet control, we made our standard anterior medial incision.  Dissection was carried down sharply to the fascia.  Medial parapatellar arthrotomy was done.  Patella was slid laterally and the knee flexed to 90 degrees.  Inspection of the joint revealed complete loss of articular cartilage, tricompartmental.      We used the intramedullary guide system and the gap  to osteotomize first the femur and then the tibia, in 5 degrees of valgus to accept #5 size components, posterior stabilized.  Patella was cut flush and patellar thickness was recreated with the use of a caliper, 35 mm patella was chosen.  Trial reduction was done.  With an 11 mm thick spacer, we had excellent realignment, excellent ligament balance throughout the range of motion, and excellent patellar tracking.      Trial components were removed.  Bony surfaces were water picked with antibiotic solution.  Real components cemented into place, tibia first, followed by femur, and  patella.  When the cement had hardened, excess posterior cement was trimmed away.  We again trialed the tray, and again chose an 11 mm thick poly component.  This was snapped onto the tibial tray.  Final range of motion, patellar tracking was again excellent.      Wound was irrigated copiously with antibiotic solution, hemostasis obtained by electrocautery.  Closure done in layers, closing the fascia with #1 Vicryl, subcutaneous tissue with 2-0 Vicryl, staples on the skin.  Sterile compressive dressing applied.      We then turned our attention to the right knee.  Under tourniquet control, our standard anterior medial incision was made, dissection carried down to the fascia.  Medial parapatellar arthrotomy was done.  Patella slid laterally and the knee flexed to 90 degrees.  Inspection of joint revealed complete loss of articular cartilage, tricompartmental.      We used the intramedullary guide system to osteotomize first the femur and the tibia in 5 degrees of valgus to accept a #5 size components, posterior stabilized.  Patella was cut flush and patellar thickness was recreated with use of a caliper, 35 mm patella was chosen.  Trial reduction was done and with a 12 mm thick spacer, we had excellent ligament balance, excellent alignment, and excellent patellar tracking throughout the range of motion.      Trial components were removed.  Bony surfaces were water picked with antibiotic solution.  Real components cemented into place, tibia first, followed by femur and patella.  When the cement had hardened, excess posterior cement was trimmed away.  We again trialed the tray and again chose a 12 mm thick poly component.  This was snapped on the tibial tray.  Final range of motion and patellar tracking was again excellent.      Wound was irrigated copiously with antibiotic solution and hemostasis obtained by electrocautery.  Closure done in layers, closing the fascia with #1 Vicryl and subcutaneous tissue with 2-0  Vicryl, staples on the skin.  Sterile compressive dressing applied.  The patient awakened and taken to the recovery room in good condition.  There were no intraoperative complications and minimal blood loss.         TYLER FERNANDEZ MD             D: 2019   T: 2019   MT: DESIREE      Name:     ANTONY PERRY   MRN:      [id number]        Account:        [id number]   :      1955           Procedure Date: 2019      Document: [id number]       cc: Tyler Fernandez MD

## 2019-05-06 NOTE — PROGRESS NOTES
05/06/19 1600   Quick Adds   Type of Visit Initial PT Evaluation   Living Environment   Lives With spouse   Living Arrangements house  ([facility])   Transportation Anticipated family or friend will provide   Living Environment Comment pt lives w/ spouse in a house with 2 platform steps, once inside he has single level living.    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    Functional Level Prior   Ambulation 0-->independent   Transferring 0-->independent   Toileting 0-->independent   Bathing 0-->independent   Communication 0-->understands/communicates without difficulty   Swallowing 0-->swallows foods/liquids without difficulty   Cognition 0 - no cognition issues reported   Fall history within last six months no   Prior Functional Level Comment Indep, active   General Information   Onset of Illness/Injury or Date of Surgery - Date 05/06/19   Referring Physician Lisa   Patient/Family Goals Statement Probably TCU at [place]   Pertinent History of Current Problem (include personal factors and/or comorbidities that impact the POC) POD#0 Bilat TKA, Hx: both NITHYA, L RCT   Precautions/Limitations oxygen therapy device and L/min  (4 L)   Weight-Bearing Status - LLE weight-bearing as tolerated   Weight-Bearing Status - RLE weight-bearing as tolerated   General Info Comments spouse present and supportive   Cognitive Status Examination   Level of Consciousness lethargic/somnolent  (alert during activites, falls asleep quickly)   Follows Commands and Answers Questions 75% of the time;able to follow single-step instructions   Personal Safety and Judgment intact   Memory intact   Pain Assessment   Patient Currently in Pain Yes, see Vital Sign flowsheet  (7/10)   Integumentary/Edema   Integumentary/Edema  "Comments Bilat knees   Range of Motion (ROM)   ROM Comment 90 bilat   Strength   Strength Comments Indep SLR bilat   Bed Mobility   Bed Mobility Comments Min A x 2   Transfer Skills   Transfer Comments Min A x2 w/ fww   Gait   Gait Comments a couple steps alongside the bed w/ fww, CGA/cues   Balance   Balance Comments nees UE support of fww   Modality Interventions   Planned Modality Interventions Cryotherapy   General Therapy Interventions   Planned Therapy Interventions bed mobility training;gait training;ROM;strengthening;transfer training;progressive activity/exercise   Clinical Impression   Criteria for Skilled Therapeutic Intervention yes, treatment indicated   PT Diagnosis impaired gait and functional mobility   Influenced by the following impairments pain, loss of ROM, loss of LE strength, fatigue, nausea   Functional limitations due to impairments bed mobility, transfers, gait   Clinical Presentation Evolving/Changing   Clinical Presentation Rationale very sleepy, nausea, pain levels fluctuating   Clinical Decision Making (Complexity) Moderate complexity   Therapy Frequency` 2 times/day   Predicted Duration of Therapy Intervention (days/wks) 3 days   Anticipated Equipment Needs at Discharge front wheeled walker   Anticipated Discharge Disposition Transitional Care Facility   Risk & Benefits of therapy have been explained Yes   Patient, Family & other staff in agreement with plan of care Yes   [facility] TM \"6 Clicks\"   2016, Trustees of [facility], under license to [employer].  All rights reserved.   6 Clicks Short Forms Basic Mobility Inpatient Short Form   [facility]  \"6 Clicks\" V.2 Basic Mobility Inpatient Short Form   1. Turning from your back to your side while in a flat bed without using bedrails? 3 - A Little   2. Moving from lying on your back to sitting on the side of a flat bed without using bedrails? 2 - A Lot   3. Moving to and from a bed to a chair " (including a wheelchair)? 2 - A Lot   4. Standing up from a chair using your arms (e.g., wheelchair, or bedside chair)? 3 - A Little   5. To walk in hospital room? 2 - A Lot   6. Climbing 3-5 steps with a railing? 1 - Total   Basic Mobility Raw Score (Score out of 24.Lower scores equate to lower levels of function) 13   Total Evaluation Time   Total Evaluation Time (Minutes) 30

## 2019-05-06 NOTE — ANESTHESIA POSTPROCEDURE EVALUATION
Patient: Carlos Kingston    Procedure(s):  Bilateral total knee arthroplasty    Diagnosis:DJD  Diagnosis Additional Information: End stage arthritis of both knees  Dr. Gonazlez    Anesthesia Type:  General, Spinal, LMA    Note:  Anesthesia Post Evaluation    Patient location during evaluation: PACU  Patient participation: Able to participate in evaluation but full recovery from regional anesthesia has not yet ocurrred but is anticipated to occur within 48 hours  Level of consciousness: awake  Pain management: adequate  Airway patency: patent  Cardiovascular status: acceptable  Respiratory status: acceptable  Hydration status: acceptable  PONV: none             Last vitals:  Vitals:    05/06/19 0715 05/06/19 0718 05/06/19 1014   BP: 121/70 112/65    Pulse:      Resp: 12 29 14   Temp:   96.6  F (35.9  C)   SpO2: 97% 95%          Electronically Signed By: Jg Sung MD  May 6, 2019  10:22 AM

## 2019-05-06 NOTE — PROGRESS NOTES
Arrived to room 623 from PACU at 1125 via cart, transferred to bed via hover mat without difficulty, alert and oriented x 4, oriented to room and call system, dressing CDI, CMS intact, IV patent and infusing, martinez patent, rates pain 0/10, reviewed welcome folder and pain/medication information packet with patient and wife. SCD's on BLE. Diego ice chips well. Frequent VS started. Will continue to monitor.

## 2019-05-06 NOTE — ANESTHESIA CARE TRANSFER NOTE
Patient: Carlos Kingston    Procedure(s):  Bilateral total knee arthroplasty    Diagnosis: DJD  Diagnosis Additional Information: End stage arthritis of both knees  Dr. Gonzalez    Anesthesia Type:   General, Spinal, LMA     Note:  Airway :Face Mask  Patient transferred to:PACU  Comments: To PACU, report to RN, oxygen per mask.      Vitals: (Last set prior to Anesthesia Care Transfer)    CRNA VITALS  5/6/2019 0940 - 5/6/2019 1021      5/6/2019             Resp Rate (observed):  8                Electronically Signed By: ANNETTE Mosher CRNA  May 6, 2019  10:21 AM

## 2019-05-07 ENCOUNTER — APPOINTMENT (OUTPATIENT)
Dept: PHYSICAL THERAPY | Facility: CLINIC | Age: 64
DRG: 462 | End: 2019-05-07
Attending: ORTHOPAEDIC SURGERY
Payer: MEDICARE

## 2019-05-07 LAB — HGB BLD-MCNC: 11.7 G/DL (ref 13.3–17.7)

## 2019-05-07 PROCEDURE — 97110 THERAPEUTIC EXERCISES: CPT | Mod: GP | Performed by: PHYSICAL THERAPIST

## 2019-05-07 PROCEDURE — 36415 COLL VENOUS BLD VENIPUNCTURE: CPT | Performed by: ORTHOPAEDIC SURGERY

## 2019-05-07 PROCEDURE — 25800030 ZZH RX IP 258 OP 636: Performed by: ORTHOPAEDIC SURGERY

## 2019-05-07 PROCEDURE — 85018 HEMOGLOBIN: CPT | Performed by: ORTHOPAEDIC SURGERY

## 2019-05-07 PROCEDURE — 97530 THERAPEUTIC ACTIVITIES: CPT | Mod: GP | Performed by: PHYSICAL THERAPIST

## 2019-05-07 PROCEDURE — 97116 GAIT TRAINING THERAPY: CPT | Mod: GP | Performed by: PHYSICAL THERAPIST

## 2019-05-07 PROCEDURE — A9270 NON-COVERED ITEM OR SERVICE: HCPCS | Performed by: ORTHOPAEDIC SURGERY

## 2019-05-07 PROCEDURE — 25000132 ZZH RX MED GY IP 250 OP 250 PS 637: Performed by: ORTHOPAEDIC SURGERY

## 2019-05-07 PROCEDURE — 12000000 ZZH R&B MED SURG/OB

## 2019-05-07 PROCEDURE — 25000128 H RX IP 250 OP 636: Performed by: ORTHOPAEDIC SURGERY

## 2019-05-07 RX ADMIN — OXYCODONE HYDROCHLORIDE 10 MG: 5 TABLET ORAL at 11:32

## 2019-05-07 RX ADMIN — RIVAROXABAN 10 MG: 10 TABLET, FILM COATED ORAL at 07:42

## 2019-05-07 RX ADMIN — OXYCODONE HYDROCHLORIDE 10 MG: 5 TABLET ORAL at 18:45

## 2019-05-07 RX ADMIN — OXYCODONE HYDROCHLORIDE 10 MG: 5 TABLET ORAL at 22:31

## 2019-05-07 RX ADMIN — GABAPENTIN 100 MG: 100 CAPSULE ORAL at 20:27

## 2019-05-07 RX ADMIN — Medication 0.5 MG: at 13:57

## 2019-05-07 RX ADMIN — SENNOSIDES AND DOCUSATE SODIUM 2 TABLET: 8.6; 5 TABLET ORAL at 20:27

## 2019-05-07 RX ADMIN — Medication 0.5 MG: at 10:33

## 2019-05-07 RX ADMIN — SODIUM CHLORIDE: 9 INJECTION, SOLUTION INTRAVENOUS at 01:22

## 2019-05-07 RX ADMIN — OXYCODONE HYDROCHLORIDE 10 MG: 5 TABLET ORAL at 05:27

## 2019-05-07 RX ADMIN — CEFAZOLIN SODIUM 2 G: 2 INJECTION, SOLUTION INTRAVENOUS at 01:23

## 2019-05-07 RX ADMIN — ACETAMINOPHEN 975 MG: 325 TABLET, FILM COATED ORAL at 13:51

## 2019-05-07 RX ADMIN — ACETAMINOPHEN 975 MG: 325 TABLET, FILM COATED ORAL at 05:28

## 2019-05-07 RX ADMIN — PROCHLORPERAZINE EDISYLATE 5 MG: 5 INJECTION INTRAMUSCULAR; INTRAVENOUS at 01:53

## 2019-05-07 RX ADMIN — OXYCODONE HYDROCHLORIDE 10 MG: 5 TABLET ORAL at 01:19

## 2019-05-07 RX ADMIN — ACETAMINOPHEN 975 MG: 325 TABLET, FILM COATED ORAL at 22:31

## 2019-05-07 RX ADMIN — SENNOSIDES AND DOCUSATE SODIUM 1 TABLET: 8.6; 5 TABLET ORAL at 07:42

## 2019-05-07 RX ADMIN — OXYCODONE HYDROCHLORIDE 10 MG: 5 TABLET ORAL at 08:48

## 2019-05-07 RX ADMIN — GABAPENTIN 100 MG: 100 CAPSULE ORAL at 13:51

## 2019-05-07 RX ADMIN — ATORVASTATIN CALCIUM 10 MG: 10 TABLET, FILM COATED ORAL at 07:42

## 2019-05-07 RX ADMIN — OXYCODONE HYDROCHLORIDE 10 MG: 5 TABLET ORAL at 15:15

## 2019-05-07 RX ADMIN — GABAPENTIN 100 MG: 100 CAPSULE ORAL at 07:42

## 2019-05-07 ASSESSMENT — ACTIVITIES OF DAILY LIVING (ADL)
ADLS_ACUITY_SCORE: 12

## 2019-05-07 NOTE — PLAN OF CARE
A&Ox4, VSS: on capno with 4L oxygen nc. Slept between cares. Had episode of nausea after getting pain medication, compazine given with relief. CMS: intact. Drsg: CDI. Pain managed with Scheduled Tylenol and PRN Oxycodone. Jeong patent and stays on till Physical therapy sees him per pt. Request. Nursing continue to monitor.

## 2019-05-07 NOTE — PROGRESS NOTES
.Discharge Planner   Discharge Plans in progress: rehab facility   Barriers to discharge plan: assessments and bed availability  Follow up plan: will await further MD determination of discharge plan, facility availability, continue planning accordingly.        Entered by: BRANDON Lafleur 05/07/2019 11:34 AM

## 2019-05-07 NOTE — PLAN OF CARE
Discharge Planner OT   Patient plan for discharge: TCU, ortho MD and PANCHO state TCU  Current status: OT order received, chart reviewed. Per chart review and discussion with PT, pt currently requiring increased assist for all transfers/mobility tasks due to pain. Pt requiring assist for all ADLS at this time. Pt prefers to discharge to TCU. Pt will benefit from skilled OT services, most appropriate to initiate at next level of care.   Barriers to return to prior living situation: Refer to PT  Recommendations for discharge: TCU - defer OT eval to TCU  Rationale for recommendations: Pt with planned discharge to TCU. Recommend OT eval in TCU setting. Will complete IP OT order. PT to continue following for mobility.        Entered by: Afia Ruth 05/07/2019 3:23 PM

## 2019-05-07 NOTE — PLAN OF CARE
Discharge Planner PT   Patient plan for discharge: Pt desires TCU  Current status: Increased pain today, R knee 8/10, L knee 9/10 w/ gait and ex. Participates in all TKA ex, needs A with L SLR and SAQ today more so due to high pain than true weakness. ROM L 95, R 90. Min A bed mobility, Min A>CGA x2 sit<>stand w/ fww, No KI needed. Gait w/ fww 20' x1 with high pain levels and light headedness as limiting factors. /75. In wc, recommended 30 min, nurse aware.   Barriers to return to prior living situation: Stairs, needs A with all mobility, limited gait tolerance, pain levels high  Recommendations for discharge: TCU  Rationale for recommendations: Level of A needed, will benefit from skilled PT and nursing assist upon hospital discharge to progress ROM, strength, gait, and pain management.        Entered by: Chrissie Alejandre 05/07/2019 10:12 AM

## 2019-05-07 NOTE — CONSULTS
.Care Transition Initial Assessment - SW  Discharge planning: Consult received for discharge planning in anticipation of pt's transfer to rehab facility on discharge. Noted PT assessment and recommendation for TCU on discharge.   Met with: Patient    Active Problems:    Status post total bilateral knee replacement       DATA  Lives With: spouse   Living Arrangements: house([facility])  Quality of Family Relationships: involved, supportive  Description of Support System: Supportive, Involved  Who is your support system?: Wife  Support Assessment: Adequate family and caregiver support.   Resources List: Skilled Nursing Facility     Quality of Family Relationships: involved, supportive  Transportation Anticipated: (to be determined)     INTERVENTION     Met with pt who affirms planning for his transfer to rehab facility on discharge. Pt was provided SNF Planning guide which includes Medicare/SNF coverage information as well as SNF listing. Pt identifies [facility] and [facility] in [place] as facilities of preference due to proximity to his home ( [place]). Other facilities of consideration would be [facility] and [facility], referrals made. Pt has not yet determined private or semi-private room, discussed with him that he would be responsible for the private room fees which he acknowledged.      Addressed additional questions of pt including determining factors for rehab facility length on stay. Pt noted that he will most likely have his wife provide the transportation for him.. it was noted of the availability of medical transport if needed, discussed also the cost ($75/base and $5/mi) which would not be covered by his Medicare insurance.     ASSESSMENT  Cognitive Status:  alert and oriented   PLAN    Patient given options and choices for discharge: Yes   Patient/family is agreeable to the plan?  Yes  Patient Goals and Preferences: independence with ambulation and ADLs  Patient anticipates  discharging to:  Rehab facility     Will await facility assessments and availability, MD determination of discharge plan, continue planning accordingly.     Addendum:       D: Noted per MD documentation the anticipation of pt's transfer to rehab facility in 2 days ( Thursday). [facility] has assessed and would [facility] to accept pt on Thursday, private room with no cost to pt for the private room. Met with pt and discussed, he has asked that planning continue for his transfer there. SW will follow-up with facility tomorrow to determine what time on Thursday the bed would be available, will discuss then with pt for finalization of arrangements.

## 2019-05-07 NOTE — PLAN OF CARE
Afeb, vss, cms intact, ace wraps clean and dry, martinez patent with large urine output, discontinued before pm therapy session since pt did tolerate getting up and ambulating about 20 feet and sat in w/c for 30min. Pain mod to severe, treated with both po oxycodone and IV Dilaudid for breakthru pain. Up with walker and assist of 1-2, no immobilizer needed.  Pt plans on going to TCU on discharge, already set up by [facility].

## 2019-05-07 NOTE — PROGRESS NOTES
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 Please contact Infection Prevention with any questions/concerns at *[phone].    Nini Claudio, ICP

## 2019-05-07 NOTE — PROGRESS NOTES
Carlos Kingston  5/7/2019  POD # 1  Subjective:     Doing well.  No immediate surgical complications identified.  Pain well-controlled.  Objective:    Exam:  CMS: intact  alert, stable, wound ok        PLAN: Start physical therapy  Discharge plan: Rehab transfer in 2 days.

## 2019-05-07 NOTE — PLAN OF CARE
Vital signs stable.On Capnography, O2 at 4 lpm nasal cannula  sats 90's.  Lungs clear, encouraged inspirometer use.  Bowel sounds hypoactive, nausea, emesis x1 tonight,no request for antinausea meds.  Dressing intact to bilateral knees, cms intact.  Sat up at bedside, stood ,using walker, gait belt and assist of 2.  Pain controlled with ice pack application, tylenol, oxycodone.  Post op plan of care reviewed with pt.

## 2019-05-08 ENCOUNTER — APPOINTMENT (OUTPATIENT)
Dept: PHYSICAL THERAPY | Facility: CLINIC | Age: 64
DRG: 462 | End: 2019-05-08
Attending: ORTHOPAEDIC SURGERY
Payer: MEDICARE

## 2019-05-08 LAB — HGB BLD-MCNC: 11.6 G/DL (ref 13.3–17.7)

## 2019-05-08 PROCEDURE — 12000000 ZZH R&B MED SURG/OB

## 2019-05-08 PROCEDURE — 97110 THERAPEUTIC EXERCISES: CPT | Mod: GP | Performed by: PHYSICAL THERAPY ASSISTANT

## 2019-05-08 PROCEDURE — 25000132 ZZH RX MED GY IP 250 OP 250 PS 637: Performed by: INTERNAL MEDICINE

## 2019-05-08 PROCEDURE — 97116 GAIT TRAINING THERAPY: CPT | Mod: GP | Performed by: PHYSICAL THERAPY ASSISTANT

## 2019-05-08 PROCEDURE — 97530 THERAPEUTIC ACTIVITIES: CPT | Mod: GP | Performed by: PHYSICAL THERAPY ASSISTANT

## 2019-05-08 PROCEDURE — 36415 COLL VENOUS BLD VENIPUNCTURE: CPT | Performed by: ORTHOPAEDIC SURGERY

## 2019-05-08 PROCEDURE — 25000132 ZZH RX MED GY IP 250 OP 250 PS 637: Performed by: ORTHOPAEDIC SURGERY

## 2019-05-08 PROCEDURE — A9270 NON-COVERED ITEM OR SERVICE: HCPCS | Performed by: ORTHOPAEDIC SURGERY

## 2019-05-08 PROCEDURE — A9270 NON-COVERED ITEM OR SERVICE: HCPCS | Performed by: INTERNAL MEDICINE

## 2019-05-08 PROCEDURE — 25000128 H RX IP 250 OP 636: Performed by: ORTHOPAEDIC SURGERY

## 2019-05-08 PROCEDURE — 85018 HEMOGLOBIN: CPT | Performed by: ORTHOPAEDIC SURGERY

## 2019-05-08 RX ORDER — OXYCODONE HYDROCHLORIDE 5 MG/1
5-10 TABLET ORAL EVERY 4 HOURS PRN
Qty: 40 TABLET | Refills: 0 | Status: SHIPPED | OUTPATIENT
Start: 2019-05-08 | End: 2020-07-31

## 2019-05-08 RX ORDER — HYDROXYZINE HYDROCHLORIDE 25 MG/1
25 TABLET, FILM COATED ORAL EVERY 6 HOURS PRN
Status: DISCONTINUED | OUTPATIENT
Start: 2019-05-08 | End: 2019-05-09 | Stop reason: HOSPADM

## 2019-05-08 RX ADMIN — ACETAMINOPHEN 975 MG: 325 TABLET, FILM COATED ORAL at 06:32

## 2019-05-08 RX ADMIN — SENNOSIDES AND DOCUSATE SODIUM 2 TABLET: 8.6; 5 TABLET ORAL at 07:59

## 2019-05-08 RX ADMIN — OXYCODONE HYDROCHLORIDE 10 MG: 5 TABLET ORAL at 10:12

## 2019-05-08 RX ADMIN — OXYCODONE HYDROCHLORIDE 10 MG: 5 TABLET ORAL at 14:10

## 2019-05-08 RX ADMIN — SENNOSIDES AND DOCUSATE SODIUM 2 TABLET: 8.6; 5 TABLET ORAL at 20:25

## 2019-05-08 RX ADMIN — ATORVASTATIN CALCIUM 10 MG: 10 TABLET, FILM COATED ORAL at 07:59

## 2019-05-08 RX ADMIN — OXYCODONE HYDROCHLORIDE 10 MG: 5 TABLET ORAL at 18:22

## 2019-05-08 RX ADMIN — RIVAROXABAN 10 MG: 10 TABLET, FILM COATED ORAL at 07:59

## 2019-05-08 RX ADMIN — Medication 0.5 MG: at 13:17

## 2019-05-08 RX ADMIN — Medication 0.5 MG: at 08:08

## 2019-05-08 RX ADMIN — OXYCODONE HYDROCHLORIDE 10 MG: 5 TABLET ORAL at 06:33

## 2019-05-08 RX ADMIN — OXYCODONE HYDROCHLORIDE 10 MG: 5 TABLET ORAL at 02:44

## 2019-05-08 RX ADMIN — GABAPENTIN 100 MG: 100 CAPSULE ORAL at 07:59

## 2019-05-08 RX ADMIN — GABAPENTIN 100 MG: 100 CAPSULE ORAL at 20:25

## 2019-05-08 RX ADMIN — ACETAMINOPHEN 975 MG: 325 TABLET, FILM COATED ORAL at 22:04

## 2019-05-08 RX ADMIN — OXYCODONE HYDROCHLORIDE 10 MG: 5 TABLET ORAL at 22:04

## 2019-05-08 RX ADMIN — ACETAMINOPHEN 975 MG: 325 TABLET, FILM COATED ORAL at 13:16

## 2019-05-08 RX ADMIN — Medication 0.5 MG: at 01:33

## 2019-05-08 RX ADMIN — HYDROXYZINE HYDROCHLORIDE 25 MG: 25 TABLET ORAL at 20:25

## 2019-05-08 RX ADMIN — GABAPENTIN 100 MG: 100 CAPSULE ORAL at 13:16

## 2019-05-08 ASSESSMENT — ACTIVITIES OF DAILY LIVING (ADL)
ADLS_ACUITY_SCORE: 12

## 2019-05-08 NOTE — PLAN OF CARE
Vital signs, low grade fever at hs,.  Lungs clear, encouraged inspirometer use when awake,  Bowel sounds hypoactive, passing flatus, voiding.  CMS intact, dressing to bilateral knees intact.  Ambulated  to bathroom with assist of 1 using walker, gait belt, pt got tired and painful when up to bathroom , refused to ambulate  in [place] tonight.Sat up and stood at bedside.  Pain controlled with ice pack application, tylenol, oxycodone.  Hgb 11.7  Post op plan of care reviewed with pt and spouse.

## 2019-05-08 NOTE — PLAN OF CARE
A&OX4, VSS: low grade fever. CMS: intact. Drsg: cdi. Pain controled with PRN Oxycodone q4h and Scheduled Tylenol. Voiding adequately;ly per urinal.  IS and fluids encouraged. Nursing continue to monitor.

## 2019-05-08 NOTE — PROGRESS NOTES
Your information has been submitted on May 08th, 2019 at 11:59:33 AM CDT. The confirmation number is [id number]

## 2019-05-08 NOTE — PROGRESS NOTES
VERN     D: Discharge planning continuing.. per discussion with MD pt will be ready for discharge to TCU tomorrow. PANCHO has spoken this morning to admissions coordinator at [facility] affirming bed availability for pt for tomorrow.       I: Met with pt discussed availability at [facility] for tomorrow, private room, no private room fees for pt. Pt will have his wife provide the transport, 1200 arranged.      A/P: Anticipate no problem with arrangements made for transfer tomorrow, available until discharge should adjustments be needed in plan as noted.

## 2019-05-08 NOTE — PLAN OF CARE
Low grade temps (99's) tachycardic 100-110's, BPs not as high as overnight. Pain less today, 4-5 mostly with it going to a 6 with therapy. Did use IV dilaudid for breakthrough before the therapy sessions but it did come out this pm. Voiding well since martinez out yesterday, passing flatus with good bowel sounds and no further nausea/vomiting. Ace wraps off and aquacels clean and dry. Not much swelling or bruising in the legs. Up with assist of 1 and walker. [facility] TCU tomorrow.

## 2019-05-08 NOTE — PLAN OF CARE
Discharge Planner PT   Patient plan for discharge: Pt desires TCU  Current status: PT - Pt transfers supine to sit with min assist with advancing B LEs to EOB. Required assist with advancing B LEs to the floor d/t increase pain with flexion.  Pt transfers sit to/from stand with min assist of 2.  Pt transfers bed to chair with ww with min assist of 1 and 1 following with ww.  Pt amb 35' with ww with min assist of 1 and 1 to following with ww.   Barriers to return to prior living situation: Stairs, needs A with all mobility, limited gait tolerance, pain levels high  Recommendations for discharge: TCU per plan established by the PT.  Rationale for recommendations: Level of A needed, will benefit from skilled PT and nursing assist upon hospital discharge to progress ROM, strength, gait, and pain management.        Entered by: Jo Dowell 05/08/2019 9:12 AM

## 2019-05-09 ENCOUNTER — APPOINTMENT (OUTPATIENT)
Dept: PHYSICAL THERAPY | Facility: CLINIC | Age: 64
DRG: 462 | End: 2019-05-09
Attending: ORTHOPAEDIC SURGERY
Payer: MEDICARE

## 2019-05-09 VITALS
RESPIRATION RATE: 18 BRPM | HEART RATE: 115 BPM | SYSTOLIC BLOOD PRESSURE: 145 MMHG | BODY MASS INDEX: 32.82 KG/M2 | OXYGEN SATURATION: 91 % | WEIGHT: 197 LBS | TEMPERATURE: 97.8 F | HEIGHT: 65 IN | DIASTOLIC BLOOD PRESSURE: 84 MMHG

## 2019-05-09 PROCEDURE — 25000132 ZZH RX MED GY IP 250 OP 250 PS 637: Performed by: INTERNAL MEDICINE

## 2019-05-09 PROCEDURE — A9270 NON-COVERED ITEM OR SERVICE: HCPCS | Performed by: ORTHOPAEDIC SURGERY

## 2019-05-09 PROCEDURE — 97110 THERAPEUTIC EXERCISES: CPT | Mod: GP | Performed by: PHYSICAL THERAPY ASSISTANT

## 2019-05-09 PROCEDURE — 97116 GAIT TRAINING THERAPY: CPT | Mod: GP | Performed by: PHYSICAL THERAPY ASSISTANT

## 2019-05-09 PROCEDURE — A9270 NON-COVERED ITEM OR SERVICE: HCPCS | Performed by: INTERNAL MEDICINE

## 2019-05-09 PROCEDURE — 25000132 ZZH RX MED GY IP 250 OP 250 PS 637: Performed by: ORTHOPAEDIC SURGERY

## 2019-05-09 RX ADMIN — ACETAMINOPHEN 975 MG: 325 TABLET, FILM COATED ORAL at 06:28

## 2019-05-09 RX ADMIN — OXYCODONE HYDROCHLORIDE 10 MG: 5 TABLET ORAL at 09:50

## 2019-05-09 RX ADMIN — HYDROXYZINE HYDROCHLORIDE 25 MG: 25 TABLET ORAL at 07:59

## 2019-05-09 RX ADMIN — OXYCODONE HYDROCHLORIDE 10 MG: 5 TABLET ORAL at 06:29

## 2019-05-09 RX ADMIN — GABAPENTIN 100 MG: 100 CAPSULE ORAL at 07:57

## 2019-05-09 RX ADMIN — ATORVASTATIN CALCIUM 10 MG: 10 TABLET, FILM COATED ORAL at 07:57

## 2019-05-09 RX ADMIN — OXYCODONE HYDROCHLORIDE 10 MG: 5 TABLET ORAL at 02:22

## 2019-05-09 RX ADMIN — SENNOSIDES AND DOCUSATE SODIUM 2 TABLET: 8.6; 5 TABLET ORAL at 07:57

## 2019-05-09 RX ADMIN — RIVAROXABAN 10 MG: 10 TABLET, FILM COATED ORAL at 07:57

## 2019-05-09 RX ADMIN — HYDROXYZINE HYDROCHLORIDE 25 MG: 25 TABLET ORAL at 02:22

## 2019-05-09 ASSESSMENT — ACTIVITIES OF DAILY LIVING (ADL)
ADLS_ACUITY_SCORE: 12

## 2019-05-09 NOTE — PLAN OF CARE
Discharge Planner PT   Patient plan for discharge: TCU  Current status: Pt transfers supine to sit IND. Partially met Pt transfers EOB to to RW with Mod-A  and elevated bed. After amb pt performed RW>chair with Min-A of 2 and vc for LLE placement.  Goal not met. Pt amb 60' with RW and MIn-A  not met.  [place] through R knee buckeled with slight LOB, but recovered with Min-A . Pt performed QS, HS, SAQ and LAQ x10 AROM. LLE was a bit more limited than RLE.   Stairs were not performed. Not met  Barriers to return to prior living situation:Stairs, A with transfers and amb, triceps and B LE fatigues quickly during gait.   Recommendations for discharge: TCU per plan established by the PT.  Rationale for recommendations: Pt discharging to TCU . Collaborated w/ PT- goals partially met. Please refer to discharge summary.     Entered by: Jo Dowell 05/09/2019 11:19 AM

## 2019-05-09 NOTE — DISCHARGE INSTRUCTIONS
Return to clinic in 10-14 days.  Call [phone] to schedule or if you experience any problems before your scheduled appointment.      1. Do exercises at home as instructed by therapist twice a day. Start outpatient therapy as ordered by your doctor if needed after you leave TCU..  2. Ice knee after exercises and therapy.  3. Examine dressing daily for problems.  4. May shower, can get dressing wet, no soaking (no bathtubs, pools or hot tubs)  5. Notify your dentist or physician of your implant so you can get antibiotics before any dental work or before any invasive procedure (ie: colonoscopy)    Aquacel dressing:  DO NOT CHANGE DRESSING DAILY.  Leave this dressing on until follow-up appointment with Orthopedic Surgeon.  Dressing is waterproof, can shower with it on, pat dry when done.  No soaking such as in tub baths, pools or hot tubs        While on narcotic pain medication, to prevent constipation:  1. Drink plenty of water to keep well hydrated   2. May take over the counter Colace or Senna (follow instructions on label)        Call your physician if: ([phone])   1. Persistent fever greater than 101 degrees with body chills or excessive sweating.  2. Increased/persistent redness, localized warmth, tenderness, drainage or swelling at dressing site. Greater than 50% drainage on dressing.   3. Persistent pain not controlled with oral pain medications, ice and rest.   4. No bowel movement in 3 days (may use Milk of Magnesia or other over the counter remedy).  5. Chest pain, shortness of breath, and/or calf pain with excessive swelling.  6. Generalized feeling of illness such as nausea/vomiting and/or lightheaded/dizziness.  7. Any other questions or concerns related to your surgery/recovery.    Thank you for allowing [facility] to participate in your cares!!        Methicillin-Resistant Staphylococcus aureus (MRSA)  What is MRSA?  Staphylococcus aureus bacteria or  staph  is a very common germ.  It is found on the skin or in the nose of many people. Sometimes the bacteria causes no problem, or it causes a mild infection. But it can also cause severe infections of the skin, lungs, blood, or other organs or tissues. Some staph infections can be easily treated with antibiotics. But one type of staph, Methicillin-Resistant staphylococcus areas (MRSA) can t. It s called Methicillin-Resistant because the antibiotic methicillin, which used to be effective treatment, no longer works. MRSA is common in hospitals and nursing homes or long-term care facilities. It is also spreading among healthy children and adults outside the health care system. There are two different ways MRSA can appear in the body:      Colonization: When a person carries the MRSA bacteria (often in nose or on skin), but is healthy. This person can spread MRSA to others.    Infection: When a person gets sick because of the bacteria, it's called being infected with MRSA. This person can also spread MRSA to others. If not treated properly, MRSA infections can be very serious.  What are the symptoms of MRSA infection?  MRSA skin infections start as small red bumps on the skin that look like pimples or spider bites. The small bumps usually get larger and become swollen, painful, warm to the touch and filled with pus. MRSA can also start in other ways, and it can spread deeper into the body. Common places and symptoms include:    Urinary tract: pain and burning when urinating, the need to urinate more often, fever    Blood: high fever, chills, nausea and vomiting, shortness of breath, confusion    Bone, muscle, or other tissue infections    Lungs: pneumonia in one or both lungs    Surgical wound infections    Heart: Infection of the lining of the heart called endocarditis  Who s at risk?  Anyone can get a staph infection. But certain factors make infection more likely, including:    A current or recent stay in the hospital    Living in a nursing home  or long-term care facility or other crowded areas, like  barracks or [facility]    Taking antibiotics    Having certain health conditions, such as diabetes or HIV    Getting kidney dialysis    Sharing sports equipment, razors or other sharp objects  How does MRSA spread?  MRSA usually spreads through skin-to-skin contact, whether through contact with an infected person or on the hands of health care workers who work with infected patients. MRSA can also spread through contact with contaminated objects, such as cart handles and bedrails or shared towels or athletic equipment.  How is MRSA treated?  MRSA infections are usually treated with antibiotics. These may be in pill form or through a vein (intravenous or IV). If you have a skin abscess, we may drain it.   Patients who test positive for MRSA colonization may go through a process called decolonization. We apply a topical antibiotic inside your nose to kill the bacteria. We may also wash your skin with a special soap.  Controlling and Preventing MRSA: In the hospital  Hospitals and nursing homes control and prevent MRSA by doing the following:    Handwashing. This is the single most important way to prevent the spread of germs.    Protective clothing. Health care workers and visitors may wear gloves and a gown when entering the room of a patient with MRSA. They remove these items before leaving.    Private rooms. Patients with MRSA infections are placed in private rooms or in a room with others who have the same infection.    Avoid antibiotic overuse. Too much use can cause germs to resist some antibiotics.    Monitoring. Hospitals monitor the spread of MRSA and educate all staff on the best ways to prevent it.  What you can do as a patient    Ask all hospital staff to wash their hands before touching you. Don t be afraid to speak up!    Wash your own hands often with soap and warm water, or use an alcohol-based hand gel. This is especially important:  ? After  using the bathroom  ? After touching a bandage  ? Before eating    Encourage family and friends to wash hands as well    If you need to have a test done, such as an X-ray, follow instructions from staff. You may need to change into a clean hospital gown and wash your hands just before leaving your room.  Controlling and Preventing MRSA: at Home  Patients:    Wash your hands often with soap and warm water, or use an alcohol-based hand gel. This is especially important:  ? After using the bathroom  ? After touching a bandage  ? Before eating    Follow instructions we give you for caring for surgical wounds or any tubes that you have, such as a catheter or dialysis port.    Keep cuts and scrapes clean and covered until they heal.    Do not share towels, razors, clothing or athletic equipment.    Take all antibiotics your doctor prescribed. Don t take half doses or stop the antibiotics, even if you feel better.  Caregivers:    Wash your hands well with soap and warm water before and after any contact with the patient. You can use an alcohol-based hand gel if your hands aren t visibly dirty.    Wear disposable gloves when changing a bandage, touching an infected wound or handling dirty laundry. Throw away the gloves after each use. Then wash your hands well.    Change the patient s bedding once a week, or more often if it s soiled with feces or body fluids. Wash and dry it alone in a washer and dryer using the warmest temperatures recommended on the labels. Use liquid bleach during the wash cycle if the label permits.    Clean surfaces like tabletops and sinks really well. Keep bathrooms, toilets and bedside commodes clean. A mixture of 1/4 cup of bleach to 1 quart of water works great for this.  Understanding drug resistance  Hard-to-kill (resistant) germs, such as MRSA, develop when antibiotics are taken longer than needed. They can also develop when antibiotics are taken when they aren't needed, or are not taken  exactly as directed. This is because any germs that survive treatment with an antibiotic can multiply and thus create more resistant germs. The more often antibiotics are used, the more chances resistant germs have to develop. This is why your care team may not prescribe antibiotics unless he or she is certain that they are needed.  Date Last Reviewed: 10/1/2016    7122-2415 [employer]. [address] [place] [address]. All rights reserved. This information is not intended as a substitute for professional medical care. Always follow your healthcare professional's instructions.  This information has been modified by your health care provider with permission from the publisher.

## 2019-05-09 NOTE — PLAN OF CARE
Vss except tachy hr. 99.8. Lungs clr-room air low 90s. Is done to 2400. Hypoactive bs/+gas, no nausea. Tolerating diet. Last bm 05/06/19. Voiding. No iv access. Drsg-dried drainage. Cms-2+ edema right/1+ left & ankles. Pain managed with Tylenol/Oxycodone/Atarax. Skin has few scabs. Tolerating ice and repositioning. Pt remains on Contact isolation precautions. Pt planning on discharging to [facility] in [place] via wife transport at 1200 today. No other significant issues noted overnight.

## 2019-05-13 ENCOUNTER — DOCUMENTATION ONLY (OUTPATIENT)
Dept: OTHER | Facility: CLINIC | Age: 64
End: 2019-05-13

## 2019-05-13 NOTE — DISCHARGE SUMMARY
Orthopedic Discharge Summary    Carlos Kingston MRN# [id number]   YOB: 1955 Age: 64 year old     Date of Admission:  5/6/2019  Date of Discharge:  5/9/2019 11:37 AM  Admitting Physician:  Elliott Gonzalez MD  Discharge Physician:  No att. providers found  Discharging Service:  Orthopedics     Home clinic: [facility]  Primary Provider: No Ref-Primary, Physician          Admission Diagnoses:   DJD  Status post total bilateral knee replacement          Discharge Diagnosis:   Patient Active Problem List   Diagnosis     S/P total hip arthroplasty     Hyperlipidemia LDL goal <130     Status post total bilateral knee replacement                Discharge Disposition:   Discharged to short-term care facility           Condition on Discharge:   Discharge condition: Good           Medications Prior to Admission:     No medications prior to admission.             Discharge Medications:     No current facility-administered medications for this encounter.      Current Outpatient Medications   Medication Sig     acetaminophen-caffeine (EXCEDRIN TENSION HEADACHE) 500-65 MG TABS Take 2 tablets by mouth every 6 hours as needed for mild pain     atorvastatin (LIPITOR) 10 MG tablet Take 1 tablet (10 mg) by mouth daily     oxyCODONE (ROXICODONE) 5 MG tablet Take 1-2 tablets (5-10 mg) by mouth every 4 hours as needed     rivaroxaban ANTICOAGULANT (XARELTO) 10 MG TABS tablet Take 1 tablet (10 mg) by mouth daily for 14 days               Brief History of Illness:   Carlos Kingston is a 64 year old male who was admitted for bilateral knee osteoarthritis.          Hospital Course:     Status post total bilateral knee replacement    * No resolved hospital problems. *              Discharge Instructions and Follow-Up:   Discharge diet: Regular   Discharge activity: Activity as tolerated   Discharge follow-up: Follow up with Dr. Gonzalez in 10-14 days   Outpatient therapy: None    Home Care agency: None     Supplies and equipment: None   Lines and drains: None    Wound care: None   Other instructions: None

## 2019-11-15 ENCOUNTER — OFFICE VISIT (OUTPATIENT)
Dept: UROLOGY | Facility: CLINIC | Age: 64
End: 2019-11-15
Payer: MEDICARE

## 2019-11-15 VITALS
BODY MASS INDEX: 31.82 KG/M2 | OXYGEN SATURATION: 94 % | SYSTOLIC BLOOD PRESSURE: 140 MMHG | DIASTOLIC BLOOD PRESSURE: 80 MMHG | HEIGHT: 66 IN | HEART RATE: 80 BPM | WEIGHT: 198 LBS

## 2019-11-15 DIAGNOSIS — N52.03 COMBINED ARTERIAL INSUFFICIENCY AND CORPORO-VENOUS OCCLUSIVE ERECTILE DYSFUNCTION: Primary | ICD-10-CM

## 2019-11-15 PROCEDURE — 99212 OFFICE O/P EST SF 10 MIN: CPT | Performed by: UROLOGY

## 2019-11-15 RX ORDER — AMOXICILLIN 500 MG/1
TABLET, FILM COATED ORAL
Status: ON HOLD | COMMUNITY
Start: 2019-10-17 | End: 2020-08-04

## 2019-11-15 ASSESSMENT — PAIN SCALES - GENERAL: PAINLEVEL: NO PAIN (0)

## 2019-11-15 ASSESSMENT — MIFFLIN-ST. JEOR: SCORE: 1620.87

## 2019-11-15 NOTE — LETTER
Date:November 19, 2019      Patient was self referred, no letter generated. Do not send.        [facility] Information

## 2019-11-15 NOTE — TELEPHONE ENCOUNTER
MEDICAL RECORDS REQUEST   [facility]  [address] [place] [address]  PHONE: [phone]  Fax: [phone]        FUTURE VISIT INFORMATION                                                   Carlos Michel Thee, : 1955 scheduled for future visit at [facility]    APPOINTMENT INFORMATION:    Date: 19 8AM     Provider:  Yaron Shetty MD     Reason for Visit/Diagnosis: Penile implant     REFERRAL INFORMATION:    Referring provider:  Elliott Rocah     Specialty: MD    Referring providers clinic:  [facility]     Clinic contact number:  [phone]    RECORDS REQUESTED FOR VISIT                                                     NOTES  STATUS/DETAILS   OFFICE NOTE from referring provider  yes   OFFICE NOTE from other specialist  yes   DISCHARGE SUMMARY from hospital  no   DISCHARGE REPORT from the ER  no   OPERATIVE REPORT  no   MEDICATION LIST  yes     PRE-VISIT CHECKLIST      Record collection complete Yes- All recs in epic    Appointment appropriately scheduled           (right time/right provider) Yes   MyChart activation No- Declined    Questionnaire complete If no, please explain: In process      Completed by: Annette Clement

## 2019-11-15 NOTE — NURSING NOTE
"Chief Complaint   Patient presents with     Erectile Dysfunction     Patient here today to talk about ED       Blood pressure (!) 140/80, pulse 80, height 1.676 m (5' 6\"), weight 89.8 kg (198 lb), SpO2 94 %. Body mass index is 31.96 kg/m .    Patient Active Problem List   Diagnosis     S/P total hip arthroplasty     Hyperlipidemia LDL goal <130     Status post total bilateral knee replacement       No Known Allergies    Current Outpatient Medications   Medication Sig Dispense Refill     acetaminophen-caffeine (EXCEDRIN TENSION HEADACHE) 500-65 MG TABS Take 2 tablets by mouth every 6 hours as needed for mild pain       amoxicillin (AMOXIL) 500 MG tablet        atorvastatin (LIPITOR) 10 MG tablet Take 1 tablet (10 mg) by mouth daily 90 tablet 1     oxyCODONE (ROXICODONE) 5 MG tablet Take 1-2 tablets (5-10 mg) by mouth every 4 hours as needed (Patient not taking: Reported on 11/15/2019) 40 tablet 0     rivaroxaban ANTICOAGULANT (XARELTO) 10 MG TABS tablet Take 1 tablet (10 mg) by mouth daily for 14 days         Social History     Tobacco Use     Smoking status: Never Smoker     Smokeless tobacco: Never Used   Substance Use Topics     Alcohol use: Yes     Comment: 3-4 drinks/week     Drug use: No       DEIDRA Dillard  11/15/2019         "

## 2019-11-15 NOTE — PROGRESS NOTES
Carlos Kingston is a very pleasant 64-year-old male who has erectile dysfunction and has tried oral medication and injection therapy without improvement.  He is here to discuss a penile implant.  The procedure, the risks, the alternatives and follow-up were carefully discussed.  He is interested and I have suggested that he see Yaron Shetty MD at the [facility] for treatment.  Other past medical history: Arthritis, migraines, knee replacement surgery, hip replacement, fusion of cervical disc, vasectomy.  Medications: Excedrin tension headache formula, amoxicillin, Lipitor  Allergies: None  Exam: Alert and oriented, normal appearance, normal vital signs.  Normal respirations, neuro grossly intact  Assessment: Erectile dysfunction probably due to both arterial insufficiency and venous leak

## 2019-11-15 NOTE — LETTER
11/15/2019        RE: Carlos Kingston  [address]  [facility] [address]        Carlos Kingston is a very pleasant 64-year-old male who has erectile dysfunction and has tried oral medication and injection therapy without improvement.  He is here to discuss a penile implant.  The procedure, the risks, the alternatives and follow-up were carefully discussed.  He is interested and I have suggested that he see Yaron Shetty MD at the [facility] for treatment.  Other past medical history: Arthritis, migraines, knee replacement surgery, hip replacement, fusion of cervical disc, vasectomy.  Medications: Excedrin tension headache formula, amoxicillin, Lipitor  Allergies: None  Exam: Alert and oriented, normal appearance, normal vital signs.  Normal respirations, neuro grossly intact  Assessment: Erectile dysfunction probably due to both arterial insufficiency and venous leak          Sincerely,        Elliott Rocha MD

## 2019-11-15 NOTE — LETTER
11/15/2019       RE: Carlos Kingston  [address]  [facility] [address]     Dear Colleague,    Thank you for referring your patient, Carlos Kingston, to the [facility] [place] at [facility]. Please see a copy of my visit note below.    Carlos Kingston is a very pleasant 64-year-old male who has erectile dysfunction and has tried oral medication and injection therapy without improvement.  He is here to discuss a penile implant.  The procedure, the risks, the alternatives and follow-up were carefully discussed.  He is interested and I have suggested that he see Yaron Shetty MD at the [facility] for treatment.  Other past medical history: Arthritis, migraines, knee replacement surgery, hip replacement, fusion of cervical disc, vasectomy.  Medications: Excedrin tension headache formula, amoxicillin, Lipitor  Allergies: None  Exam: Alert and oriented, normal appearance, normal vital signs.  Normal respirations, neuro grossly intact  Assessment: Erectile dysfunction probably due to both arterial insufficiency and venous leak        Again, thank you for allowing me to participate in the care of your patient.      Sincerely,    Elliott Rocha MD

## 2019-12-12 ENCOUNTER — PRE VISIT (OUTPATIENT)
Dept: UROLOGY | Facility: CLINIC | Age: 64
End: 2019-12-12

## 2019-12-12 NOTE — TELEPHONE ENCOUNTER
Reason for Visit: Consult for IPP    Diagnosis: combined arterial insufficiency and corporo-venous occlusive erectile dysfunction    Orders/Procedures/Records: in system    Contact Patient: n/a    Rooming Requirements: normal      [place] LPN  12/12/19  10:46 AM

## 2019-12-26 ENCOUNTER — OFFICE VISIT (OUTPATIENT)
Dept: UROLOGY | Facility: CLINIC | Age: 64
End: 2019-12-26
Payer: MEDICARE

## 2019-12-26 ENCOUNTER — PRE VISIT (OUTPATIENT)
Dept: UROLOGY | Facility: CLINIC | Age: 64
End: 2019-12-26

## 2019-12-26 VITALS
HEIGHT: 66 IN | BODY MASS INDEX: 32.14 KG/M2 | HEART RATE: 73 BPM | DIASTOLIC BLOOD PRESSURE: 80 MMHG | WEIGHT: 200 LBS | SYSTOLIC BLOOD PRESSURE: 136 MMHG

## 2019-12-26 DIAGNOSIS — N52.9 ERECTILE DYSFUNCTION, UNSPECIFIED ERECTILE DYSFUNCTION TYPE: Primary | ICD-10-CM

## 2019-12-26 RX ORDER — CEFAZOLIN SODIUM 2 G/50ML
2 SOLUTION INTRAVENOUS
Status: CANCELLED | OUTPATIENT
Start: 2019-12-26

## 2019-12-26 RX ORDER — CEFAZOLIN SODIUM 1 G/50ML
1 INJECTION, SOLUTION INTRAVENOUS SEE ADMIN INSTRUCTIONS
Status: CANCELLED | OUTPATIENT
Start: 2019-12-26

## 2019-12-26 ASSESSMENT — ENCOUNTER SYMPTOMS
TASTE DISTURBANCE: 0
SMELL DISTURBANCE: 0
SHORTNESS OF BREATH: 0
SINUS CONGESTION: 1
HEMOPTYSIS: 0
NECK MASS: 0
SORE THROAT: 0
COUGH: 1
POSTURAL DYSPNEA: 0
COUGH DISTURBING SLEEP: 0
SPUTUM PRODUCTION: 1
TROUBLE SWALLOWING: 0
DYSPNEA ON EXERTION: 0
WHEEZING: 0
SINUS PAIN: 1
HOARSE VOICE: 0

## 2019-12-26 ASSESSMENT — PAIN SCALES - GENERAL: PAINLEVEL: NO PAIN (0)

## 2019-12-26 ASSESSMENT — MIFFLIN-ST. JEOR: SCORE: 1629.94

## 2019-12-26 NOTE — LETTER
"12/26/2019       RE: Carlos Kingston  [address]  [facility] [address]     Dear Colleague,    Thank you for referring your patient, Carlos Kingston, to the [facility] UROLOGY AND INST FOR PROSTATE AND UROLOGIC CANCERS at [facility] Please see a copy of my visit note below.    UROLOGY CLINIC NOTE  Date: 12/26/19    We are pleased to see . Carlos Kingston in consultation at the request of [place] for the evaluation of chief complaint listed below    CC:    Erectile dysfunction, consult for IPP surgery.         History of Present Illness:   Carlos Kingston is a(n) 64 year old MALE here for evaluation of erectile dysfunction.  The patient has had these symptoms for years and they have been increasing in severity.  The patient has trouble attaining and maintaining erections.  He has previously attempted therapy with oral medications and intracavernosal injections which no longer work well for him.  The patient does not have known cardiac or vascular disease.  He does not have diabetes.  He is not a smoker.   He has not previously undergone  surgery.  The patient is not taking anti-hypertensives, anti-depressants, or anti-psychotics.    Onset of ED around age of 48. He was started on Viagra which iintially worked well. He has been using injections x 2-3 years with concurrent use of restrictive ring. He rates his rigidity with injections and ring at a 5. He is not able to have intercourse and erections only last 30-45 seconds then \"drains out.\" No pain with injections. Now interested in surgery for  IPP placement.    He currently has a \"chest cold\" for which he just received azithromycin and is feeling improved. Otherwise no complaints.           Past Medical History:     Past Medical History:   Diagnosis Date     Arthritis     generalized     Migraines             Past Surgical History:     Past Surgical History:   Procedure Laterality Date     ANKLE SURGERY " Right     Debridement     ARTHROPLASTY HIP Right 2014    Procedure: ARTHROPLASTY HIP;  Surgeon: Elliott Gonzalez MD;  Location: [facility] OR     ARTHROPLASTY KNEE BILATERAL Bilateral 2019    Procedure: 1.  Left total knee arthroplasty.  2.  Right total knee arthroplasty.;  Surgeon: Elliott Gonzalez MD;  Location: [facility] OR     ARTHROSCOPY KNEE Right      ARTHROSCOPY KNEE WITH MEDIAL MENISCECTOMY  2013    Procedure: ARTHROSCOPY KNEE WITH MEDIAL MENISCECTOMY;  RIGHT ARTHROSCOPY KNEE WITH MEDIAL MENISCECTOMY;  Surgeon: Elliott Gonzalez MD;  Location: [facility] OR     C TOTAL HIP ARTHROPLASTY Left      CARPAL TUNNEL RELEASE RT/LT Bilateral      DISCECTOMY, FUSION CERVICAL ANTERIOR TWO LEVELS, COMBINED N/A 2014    Procedure: COMBINED DISCECTOMY, FUSION CERVICAL ANTERIOR TWO LEVELS;  Surgeon: Mainor Adan MD;  Location: [facility] OR     ORTHOPEDIC SURGERY      right ankle arthroscopy     ORTHOPEDIC SURGERY      left hip replacment     ORTHOPEDIC SURGERY      bilateral carpal tunnel     SOFT TISSUE SURGERY      fatty lump removed rt. arm     VASECTOMY              Social History:   Works in [employer]        Smoking: Never  Alcohol: 4 drinks per week  IV Drug Use: None         Family History:     Family History   Problem Relation Age of Onset     Breast Cancer Mother      Lung Cancer Mother      Thyroid Cancer Mother      Bronchitis Mother      Diabetes Father    Grandfather -  of blood clot 92 years old around time of hip surgery  No urologic cancers in the family.           Allergies:   No Known Allergies         Medications:     Current Outpatient Medications   Medication Sig     acetaminophen-caffeine (EXCEDRIN TENSION HEADACHE) 500-65 MG TABS Take 2 tablets by mouth every 6 hours as needed for mild pain     amoxicillin (AMOXIL) 500 MG tablet      atorvastatin (LIPITOR) 10 MG tablet Take 1 tablet (10 mg) by mouth daily     oxyCODONE (ROXICODONE) 5 MG tablet Take 1-2 tablets (5-10 mg) by mouth every  "4 hours as needed     rivaroxaban ANTICOAGULANT (XARELTO) 10 MG TABS tablet Take 1 tablet (10 mg) by mouth daily for 14 days     No current facility-administered medications for this visit.      NOT CURRENTLY ON XARELTO - THIS WAS FOR B/L KNEE REPLACEMENT         REVIEW OF SYSTEMS:    See HPI for pertinent details.  Remainder of 10-point ROS negative.    Answers for HPI/ROS submitted by the patient on 12/26/2019   General Symptoms: No  Skin Symptoms: No  HENT Symptoms: Yes  EYE SYMPTOMS: No  HEART SYMPTOMS: No  LUNG SYMPTOMS: Yes  INTESTINAL SYMPTOMS: No  URINARY SYMPTOMS: No  REPRODUCTIVE SYMPTOMS: Yes  SKELETAL SYMPTOMS: No  BLOOD SYMPTOMS: No  NERVOUS SYSTEM SYMPTOMS: No  MENTAL HEALTH SYMPTOMS: No  Ear pain: No  Ear discharge: No  Hearing loss: No  Tinnitus: No  Nosebleeds: No  Congestion: Yes  Sinus pain: Yes  Trouble swallowing: No   Voice hoarseness: No  Mouth sores: No  Sore throat: No  Tooth pain: No  Gum tenderness: No  Bleeding gums: No  Change in taste: No  Change in sense of smell: No  Dry mouth: No  Hearing aid used: No  Neck lump: No  Cough: Yes  Sputum or phlegm: Yes  Coughing up blood: No  Difficulty breating or shortness of breath: No  Wheezing: No  Difficulty breathing on exertion: No  Nighttime Cough: No  Difficulty breathing when lying flat: No  Scrotal pain or swelling: No  Erectile dysfunction: Yes  Penile discharge: No  Genital ulcers: No  Reduced libido: No           PHYSICAL EXAM   /80   Pulse 73   Ht 1.676 m (5' 6\")   Wt 90.7 kg (200 lb)   BMI 32.28 kg/m     GENERAL: No acute distress. Well nourished.   HEENT:  Sclerae anicteric.  Conjunctivae pink.  Moist mucous membranes.  NECK:  Supple.  No lymphadenopathy.  CARDIAC:  Regular rate and rhythm.  LUNGS:  Non-labored breathing  BACK:  No costovertebral tenderness.  ABDOMEN: Soft, non-tender, no surgical scars, no organomegaly, non-tender.  :  Phallus circumcised, meatus adequate, no plaques palpated. Testes descended " bilaterally, no intratesticular masses.  Epididymes non-tender.  No varicoceles or inguinal hernias.  RECTAL:  Deferred   SKIN: No rashes.  Dry.     EXTREMITIES:  Warm, well perfused.  No lower extremity edema bilaterally.  NEURO: normal gait, no focal deficits.           LABS AND IMAGING:   none          ASSESSMENT:   Carlos Kingston is a 64 year old male who presents for evaluation of erectile dysfunction.    A lengthy conversation was held with the patient regarding treatment options for erectile dysfunction including: PDE5-inhibitors, MUSE, intracavernosal injection (ICI), the vacuum assist device (VENTURA), constriction bands and penile prostheses.  The potential benefits of each as well as their associated side effects and potential risks were discussed.          PLAN:     PVR 57cc today.    Schedule IPP surgery.    Patient was seen and examined with Dr. Shetty    CC: Aj    I saw and examined the patient with the resident today.  I agree with the resident note and plan of care as above.     I counseled the patient on the risks of penile implant surgery. These include, but are not limited to infection, scarring, penile shortening, damage to urethra and bladder, pain and erosion. I explained to him the risk of infection and the need for explantation in those cases; that other treatments for ED cannot be used after placing an implant, and that implants have a mechanical failure rate.  Discussed possible ectopic reservoir, possible glans necrosis/ tissue loss.  I answered all of his questions to the best of my ability and to the patient's satisfaction.     Yaron Shetty MD  Urology Staff

## 2019-12-26 NOTE — PROGRESS NOTES
"UROLOGY CLINIC NOTE  Date: 12/26/19    We are pleased to see . Carlos Kingston in consultation at the request of Aj for the evaluation of chief complaint listed below    CC:    Erectile dysfunction, consult for IPP surgery.         History of Present Illness:   Carlos Kingston is a(n) 64 year old MALE here for evaluation of erectile dysfunction.  The patient has had these symptoms for years and they have been increasing in severity.  The patient has trouble attaining and maintaining erections.  He has previously attempted therapy with oral medications and intracavernosal injections which no longer work well for him.  The patient does not have known cardiac or vascular disease.  He does not have diabetes.  He is not a smoker.   He has not previously undergone  surgery.  The patient is not taking anti-hypertensives, anti-depressants, or anti-psychotics.    Onset of ED around age of 48. He was started on Viagra which iintially worked well. He has been using injections x 2-3 years with concurrent use of restrictive ring. He rates his rigidity with injections and ring at a 5. He is not able to have intercourse and erections only last 30-45 seconds then \"drains out.\" No pain with injections. Now interested in surgery for  IPP placement.    He currently has a \"chest cold\" for which he just received azithromycin and is feeling improved. Otherwise no complaints.           Past Medical History:     Past Medical History:   Diagnosis Date     Arthritis     generalized     Migraines             Past Surgical History:     Past Surgical History:   Procedure Laterality Date     ANKLE SURGERY Right     Debridement     ARTHROPLASTY HIP Right 12/29/2014    Procedure: ARTHROPLASTY HIP;  Surgeon: Elliott Gonzalez MD;  Location:  OR     ARTHROPLASTY KNEE BILATERAL Bilateral 5/6/2019    Procedure: 1.  Left total knee arthroplasty.  2.  Right total knee arthroplasty.;  Surgeon: Elliott Gonzalez MD;  Location:  OR "     ARTHROSCOPY KNEE Right      ARTHROSCOPY KNEE WITH MEDIAL MENISCECTOMY  2013    Procedure: ARTHROSCOPY KNEE WITH MEDIAL MENISCECTOMY;  RIGHT ARTHROSCOPY KNEE WITH MEDIAL MENISCECTOMY;  Surgeon: Elliott Gonzalez MD;  Location: [facility] OR     C TOTAL HIP ARTHROPLASTY Left      CARPAL TUNNEL RELEASE RT/LT Bilateral      DISCECTOMY, FUSION CERVICAL ANTERIOR TWO LEVELS, COMBINED N/A 2014    Procedure: COMBINED DISCECTOMY, FUSION CERVICAL ANTERIOR TWO LEVELS;  Surgeon: Mainor Adan MD;  Location: [facility] OR     ORTHOPEDIC SURGERY      right ankle arthroscopy     ORTHOPEDIC SURGERY      left hip replacment     ORTHOPEDIC SURGERY      bilateral carpal tunnel     SOFT TISSUE SURGERY      fatty lump removed rt. arm     VASECTOMY              Social History:   Works in [employer]        Smoking: Never  Alcohol: 4 drinks per week  IV Drug Use: None         Family History:     Family History   Problem Relation Age of Onset     Breast Cancer Mother      Lung Cancer Mother      Thyroid Cancer Mother      Bronchitis Mother      Diabetes Father    Grandfather -  of blood clot 92 years old around time of hip surgery  No urologic cancers in the family.           Allergies:   No Known Allergies         Medications:     Current Outpatient Medications   Medication Sig     acetaminophen-caffeine (EXCEDRIN TENSION HEADACHE) 500-65 MG TABS Take 2 tablets by mouth every 6 hours as needed for mild pain     amoxicillin (AMOXIL) 500 MG tablet      atorvastatin (LIPITOR) 10 MG tablet Take 1 tablet (10 mg) by mouth daily     oxyCODONE (ROXICODONE) 5 MG tablet Take 1-2 tablets (5-10 mg) by mouth every 4 hours as needed     rivaroxaban ANTICOAGULANT (XARELTO) 10 MG TABS tablet Take 1 tablet (10 mg) by mouth daily for 14 days     No current facility-administered medications for this visit.      NOT CURRENTLY ON XARELTO - THIS WAS FOR B/L KNEE REPLACEMENT         REVIEW OF SYSTEMS:    See HPI for pertinent details.  Remainder of  "10-point ROS negative.    Answers for HPI/ROS submitted by the patient on 12/26/2019   General Symptoms: No  Skin Symptoms: No  HENT Symptoms: Yes  EYE SYMPTOMS: No  HEART SYMPTOMS: No  LUNG SYMPTOMS: Yes  INTESTINAL SYMPTOMS: No  URINARY SYMPTOMS: No  REPRODUCTIVE SYMPTOMS: Yes  SKELETAL SYMPTOMS: No  BLOOD SYMPTOMS: No  NERVOUS SYSTEM SYMPTOMS: No  MENTAL HEALTH SYMPTOMS: No  Ear pain: No  Ear discharge: No  Hearing loss: No  Tinnitus: No  Nosebleeds: No  Congestion: Yes  Sinus pain: Yes  Trouble swallowing: No   Voice hoarseness: No  Mouth sores: No  Sore throat: No  Tooth pain: No  Gum tenderness: No  Bleeding gums: No  Change in taste: No  Change in sense of smell: No  Dry mouth: No  Hearing aid used: No  Neck lump: No  Cough: Yes  Sputum or phlegm: Yes  Coughing up blood: No  Difficulty breating or shortness of breath: No  Wheezing: No  Difficulty breathing on exertion: No  Nighttime Cough: No  Difficulty breathing when lying flat: No  Scrotal pain or swelling: No  Erectile dysfunction: Yes  Penile discharge: No  Genital ulcers: No  Reduced libido: No           PHYSICAL EXAM   /80   Pulse 73   Ht 1.676 m (5' 6\")   Wt 90.7 kg (200 lb)   BMI 32.28 kg/m    GENERAL: No acute distress. Well nourished.   HEENT:  Sclerae anicteric.  Conjunctivae pink.  Moist mucous membranes.  NECK:  Supple.  No lymphadenopathy.  CARDIAC:  Regular rate and rhythm.  LUNGS:  Non-labored breathing  BACK:  No costovertebral tenderness.  ABDOMEN: Soft, non-tender, no surgical scars, no organomegaly, non-tender.  :  Phallus circumcised, meatus adequate, no plaques palpated. Testes descended bilaterally, no intratesticular masses.  Epididymes non-tender.  No varicoceles or inguinal hernias.  RECTAL:  Deferred   SKIN: No rashes.  Dry.     EXTREMITIES:  Warm, well perfused.  No lower extremity edema bilaterally.  NEURO: normal gait, no focal deficits.           LABS AND IMAGING:   none          ASSESSMENT:   Carlos Kingston is " a 64 year old male who presents for evaluation of erectile dysfunction.    A lengthy conversation was held with the patient regarding treatment options for erectile dysfunction including: PDE5-inhibitors, MUSE, intracavernosal injection (ICI), the vacuum assist device (VENTURA), constriction bands and penile prostheses.  The potential benefits of each as well as their associated side effects and potential risks were discussed.          PLAN:     PVR 57cc today.    Schedule IPP surgery.    Patient was seen and examined with Dr. Shetty    CC: Aj    I saw and examined the patient with the resident today.  I agree with the resident note and plan of care as above.     I counseled the patient on the risks of penile implant surgery. These include, but are not limited to infection, scarring, penile shortening, damage to urethra and bladder, pain and erosion. I explained to him the risk of infection and the need for explantation in those cases; that other treatments for ED cannot be used after placing an implant, and that implants have a mechanical failure rate.  Discussed possible ectopic reservoir, possible glans necrosis/ tissue loss.  I answered all of his questions to the best of my ability and to the patient's satisfaction.     Yaron Shetty MD  Urology Staff

## 2019-12-31 ENCOUNTER — TELEPHONE (OUTPATIENT)
Dept: UROLOGY | Facility: CLINIC | Age: 64
End: 2019-12-31

## 2019-12-31 ENCOUNTER — HOSPITAL ENCOUNTER (INPATIENT)
Facility: CLINIC | Age: 64
Setting detail: SURGERY ADMIT
End: 2019-12-31
Attending: UROLOGY | Admitting: UROLOGY
Payer: MEDICARE

## 2019-12-31 DIAGNOSIS — N52.9 ERECTILE DYSFUNCTION, UNSPECIFIED ERECTILE DYSFUNCTION TYPE: ICD-10-CM

## 2019-12-31 NOTE — TELEPHONE ENCOUNTER
Patient is scheduled for surgery with Dr. Shetty      Spoke or left message with: left voice mail with surgery information and for patient to call back at [phone].    Date of Surgery: 4/7/20    Location: [facility] OR    Informed patient they will need an adult  yes    Pre-op with surgeon (if applicable): n/a    H&P: Scheduled with pcp    Additional imaging/appointments: n/a    Surgery packet: mailed 12/31/19     Additional comments: n/a

## 2020-01-02 ENCOUNTER — TELEPHONE (OUTPATIENT)
Dept: UROLOGY | Facility: CLINIC | Age: 65
End: 2020-01-02

## 2020-01-02 NOTE — TELEPHONE ENCOUNTER
Tried calling patient to confirm surgery with Dr Shetty on 4/7/20 @ [facility] OR.  No answer and patient voice mail was full and could not leave a voice mail.

## 2020-01-03 ENCOUNTER — TELEPHONE (OUTPATIENT)
Dept: UROLOGY | Facility: CLINIC | Age: 65
End: 2020-01-03

## 2020-02-06 ENCOUNTER — TELEPHONE (OUTPATIENT)
Dept: UROLOGY | Facility: CLINIC | Age: 65
End: 2020-02-06

## 2020-02-06 NOTE — TELEPHONE ENCOUNTER
Called to reschedule surgery with Dr Shetty from 4/7/20 @ [facility] OR due to Dr Shetty being unavailable. Left voice mail for patient to call back at [phone].

## 2020-02-10 ENCOUNTER — TELEPHONE (OUTPATIENT)
Dept: UROLOGY | Facility: CLINIC | Age: 65
End: 2020-02-10

## 2020-02-10 NOTE — TELEPHONE ENCOUNTER
Patient returned call to reschedule surgery with Dr Shetty on 4/7/20 @ [facility] OR to 3/27/20 @ [facility] OR due to Dr Shetty not being available.

## 2020-05-03 DIAGNOSIS — N52.9 ERECTILE DYSFUNCTION, UNSPECIFIED ERECTILE DYSFUNCTION TYPE: Primary | ICD-10-CM

## 2020-05-03 RX ORDER — CEFAZOLIN SODIUM 2 G/50ML
2 SOLUTION INTRAVENOUS
Status: CANCELLED | OUTPATIENT
Start: 2020-05-03

## 2020-05-03 RX ORDER — CEFAZOLIN SODIUM 1 G/50ML
1 INJECTION, SOLUTION INTRAVENOUS SEE ADMIN INSTRUCTIONS
Status: CANCELLED | OUTPATIENT
Start: 2020-05-03

## 2020-07-23 ENCOUNTER — TELEPHONE (OUTPATIENT)
Dept: UROLOGY | Facility: CLINIC | Age: 65
End: 2020-07-23

## 2020-07-23 ENCOUNTER — PATIENT OUTREACH (OUTPATIENT)
Dept: UROLOGY | Facility: CLINIC | Age: 65
End: 2020-07-23

## 2020-07-23 DIAGNOSIS — Z11.59 ENCOUNTER FOR SCREENING FOR OTHER VIRAL DISEASES: Primary | ICD-10-CM

## 2020-07-23 PROBLEM — N52.9 ERECTILE DYSFUNCTION, UNSPECIFIED ERECTILE DYSFUNCTION TYPE: Status: ACTIVE | Noted: 2019-12-31

## 2020-07-23 NOTE — TELEPHONE ENCOUNTER
Patient is scheduled to have his pre-op done tomorrow at the [facility] at 4 pm. He was informed he needed urine culture and Covid-19 testing done before his procedure. Orders placed.   If there are any questions he can call me.    Chloe Samayoa RN   Care Coordinator Urology

## 2020-07-23 NOTE — TELEPHONE ENCOUNTER
Patient is scheduled for surgery with Dr. Shetty      Spoke with Carlos    Date of Surgery: 8/4/20    Location: [facility] OR    [facility] patient they will need an adult  yes    H&P: Scheduled with PCP or PAC    Additional imaging/appointments: n/a    Surgery packet: to be mailed by 7/24/20     Additional comments: informed to get covid test within 4 day of surgery

## 2020-08-02 DIAGNOSIS — Z11.59 ENCOUNTER FOR SCREENING FOR OTHER VIRAL DISEASES: ICD-10-CM

## 2020-08-02 PROCEDURE — U0003 INFECTIOUS AGENT DETECTION BY NUCLEIC ACID (DNA OR RNA); SEVERE ACUTE RESPIRATORY SYNDROME CORONAVIRUS 2 (SARS-COV-2) (CORONAVIRUS DISEASE [COVID-19]), AMPLIFIED PROBE TECHNIQUE, MAKING USE OF HIGH THROUGHPUT TECHNOLOGIES AS DESCRIBED BY CMS-2020-01-R: HCPCS | Performed by: UROLOGY

## 2020-08-03 ENCOUNTER — ANESTHESIA EVENT (OUTPATIENT)
Dept: SURGERY | Facility: CLINIC | Age: 65
End: 2020-08-03
Payer: MEDICARE

## 2020-08-03 LAB
LABORATORY COMMENT REPORT: NORMAL
SARS-COV-2 RNA SPEC QL NAA+PROBE: NEGATIVE
SARS-COV-2 RNA SPEC QL NAA+PROBE: NORMAL
SPECIMEN SOURCE: NORMAL
SPECIMEN SOURCE: NORMAL

## 2020-08-04 ENCOUNTER — ANESTHESIA (OUTPATIENT)
Dept: SURGERY | Facility: CLINIC | Age: 65
End: 2020-08-04
Payer: MEDICARE

## 2020-08-04 ENCOUNTER — HOSPITAL ENCOUNTER (OUTPATIENT)
Facility: CLINIC | Age: 65
Discharge: HOME | End: 2020-08-05
Attending: UROLOGY | Admitting: UROLOGY
Payer: MEDICARE

## 2020-08-04 DIAGNOSIS — N52.9 ERECTILE DYSFUNCTION, UNSPECIFIED ERECTILE DYSFUNCTION TYPE: ICD-10-CM

## 2020-08-04 LAB — GLUCOSE BLDC GLUCOMTR-MCNC: 121 MG/DL (ref 70–99)

## 2020-08-04 PROCEDURE — C1813 PROSTHESIS, PENILE, INFLATAB: HCPCS | Performed by: UROLOGY

## 2020-08-04 PROCEDURE — 37000008 ZZH ANESTHESIA TECHNICAL FEE, 1ST 30 MIN: Performed by: UROLOGY

## 2020-08-04 PROCEDURE — 27210794 ZZH OR GENERAL SUPPLY STERILE: Performed by: UROLOGY

## 2020-08-04 PROCEDURE — 25000125 ZZHC RX 250: Performed by: STUDENT IN AN ORGANIZED HEALTH CARE EDUCATION/TRAINING PROGRAM

## 2020-08-04 PROCEDURE — 37000009 ZZH ANESTHESIA TECHNICAL FEE, EACH ADDTL 15 MIN: Performed by: UROLOGY

## 2020-08-04 PROCEDURE — 36000059 ZZH SURGERY LEVEL 3 EA 15 ADDTL MIN UMMC: Performed by: UROLOGY

## 2020-08-04 PROCEDURE — 82962 GLUCOSE BLOOD TEST: CPT

## 2020-08-04 PROCEDURE — 27810169 ZZH OR IMPLANT GENERAL: Performed by: UROLOGY

## 2020-08-04 PROCEDURE — 25000566 ZZH SEVOFLURANE, EA 15 MIN: Performed by: UROLOGY

## 2020-08-04 PROCEDURE — 25000132 ZZH RX MED GY IP 250 OP 250 PS 637: Mod: GY | Performed by: STUDENT IN AN ORGANIZED HEALTH CARE EDUCATION/TRAINING PROGRAM

## 2020-08-04 PROCEDURE — 36000057 ZZH SURGERY LEVEL 3 1ST 30 MIN - UMMC: Performed by: UROLOGY

## 2020-08-04 PROCEDURE — 25800030 ZZH RX IP 258 OP 636: Performed by: STUDENT IN AN ORGANIZED HEALTH CARE EDUCATION/TRAINING PROGRAM

## 2020-08-04 PROCEDURE — 25000128 H RX IP 250 OP 636: Performed by: UROLOGY

## 2020-08-04 PROCEDURE — 25000125 ZZHC RX 250: Performed by: UROLOGY

## 2020-08-04 PROCEDURE — 25000128 H RX IP 250 OP 636: Performed by: STUDENT IN AN ORGANIZED HEALTH CARE EDUCATION/TRAINING PROGRAM

## 2020-08-04 PROCEDURE — 40000170 ZZH STATISTIC PRE-PROCEDURE ASSESSMENT II: Performed by: UROLOGY

## 2020-08-04 PROCEDURE — 71000014 ZZH RECOVERY PHASE 1 LEVEL 2 FIRST HR: Performed by: UROLOGY

## 2020-08-04 PROCEDURE — 25800030 ZZH RX IP 258 OP 636: Performed by: UROLOGY

## 2020-08-04 DEVICE — IMP PROSTHESIS PENILE TITAN STD ASSEMBLY KIT 91-9480SC: Type: IMPLANTABLE DEVICE | Site: PENIS | Status: FUNCTIONAL

## 2020-08-04 DEVICE — IMPLANTABLE DEVICE: Type: IMPLANTABLE DEVICE | Site: PENIS | Status: FUNCTIONAL

## 2020-08-04 RX ORDER — LIDOCAINE 40 MG/G
CREAM TOPICAL
Status: DISCONTINUED | OUTPATIENT
Start: 2020-08-04 | End: 2020-08-05 | Stop reason: HOSPADM

## 2020-08-04 RX ORDER — OXYCODONE HYDROCHLORIDE 5 MG/1
5-10 TABLET ORAL
Status: DISCONTINUED | OUTPATIENT
Start: 2020-08-04 | End: 2020-08-05 | Stop reason: HOSPADM

## 2020-08-04 RX ORDER — CEFAZOLIN SODIUM 2 G/100ML
2 INJECTION, SOLUTION INTRAVENOUS
Status: COMPLETED | OUTPATIENT
Start: 2020-08-04 | End: 2020-08-04

## 2020-08-04 RX ORDER — SULFAMETHOXAZOLE/TRIMETHOPRIM 800-160 MG
1 TABLET ORAL 2 TIMES DAILY
Qty: 14 TABLET | Refills: 0 | Status: SHIPPED | OUTPATIENT
Start: 2020-08-04 | End: 2020-08-11

## 2020-08-04 RX ORDER — LIDOCAINE HYDROCHLORIDE 20 MG/ML
INJECTION, SOLUTION INFILTRATION; PERINEURAL PRN
Status: DISCONTINUED | OUTPATIENT
Start: 2020-08-04 | End: 2020-08-04

## 2020-08-04 RX ORDER — DEXAMETHASONE SODIUM PHOSPHATE 4 MG/ML
INJECTION, SOLUTION INTRA-ARTICULAR; INTRALESIONAL; INTRAMUSCULAR; INTRAVENOUS; SOFT TISSUE PRN
Status: DISCONTINUED | OUTPATIENT
Start: 2020-08-04 | End: 2020-08-04

## 2020-08-04 RX ORDER — ONDANSETRON 2 MG/ML
4 INJECTION INTRAMUSCULAR; INTRAVENOUS EVERY 30 MIN PRN
Status: DISCONTINUED | OUTPATIENT
Start: 2020-08-04 | End: 2020-08-04 | Stop reason: HOSPADM

## 2020-08-04 RX ORDER — FENTANYL CITRATE-0.9 % NACL/PF 10 MCG/ML
PLASTIC BAG, INJECTION (ML) INTRAVENOUS PRN
Status: DISCONTINUED | OUTPATIENT
Start: 2020-08-04 | End: 2020-08-04

## 2020-08-04 RX ORDER — BUPIVACAINE HYDROCHLORIDE 5 MG/ML
INJECTION, SOLUTION PERINEURAL PRN
Status: DISCONTINUED | OUTPATIENT
Start: 2020-08-04 | End: 2020-08-04 | Stop reason: HOSPADM

## 2020-08-04 RX ORDER — CEFAZOLIN SODIUM 1 G/3ML
1 INJECTION, POWDER, FOR SOLUTION INTRAMUSCULAR; INTRAVENOUS SEE ADMIN INSTRUCTIONS
Status: DISCONTINUED | OUTPATIENT
Start: 2020-08-04 | End: 2020-08-04 | Stop reason: HOSPADM

## 2020-08-04 RX ORDER — LABETALOL 20 MG/4 ML (5 MG/ML) INTRAVENOUS SYRINGE
10
Status: DISCONTINUED | OUTPATIENT
Start: 2020-08-04 | End: 2020-08-04 | Stop reason: HOSPADM

## 2020-08-04 RX ORDER — ATORVASTATIN CALCIUM 10 MG/1
10 TABLET, FILM COATED ORAL EVERY EVENING
Status: DISCONTINUED | OUTPATIENT
Start: 2020-08-04 | End: 2020-08-05 | Stop reason: HOSPADM

## 2020-08-04 RX ORDER — NALOXONE HYDROCHLORIDE 0.4 MG/ML
.1-.4 INJECTION, SOLUTION INTRAMUSCULAR; INTRAVENOUS; SUBCUTANEOUS
Status: DISCONTINUED | OUTPATIENT
Start: 2020-08-04 | End: 2020-08-04 | Stop reason: HOSPADM

## 2020-08-04 RX ORDER — AMOXICILLIN 250 MG
1-2 CAPSULE ORAL 2 TIMES DAILY
Qty: 30 TABLET | Refills: 0 | Status: SHIPPED | OUTPATIENT
Start: 2020-08-04 | End: 2022-06-23

## 2020-08-04 RX ORDER — ONDANSETRON 2 MG/ML
4 INJECTION INTRAMUSCULAR; INTRAVENOUS EVERY 6 HOURS PRN
Status: DISCONTINUED | OUTPATIENT
Start: 2020-08-04 | End: 2020-08-05 | Stop reason: HOSPADM

## 2020-08-04 RX ORDER — FENTANYL CITRATE 50 UG/ML
INJECTION, SOLUTION INTRAMUSCULAR; INTRAVENOUS PRN
Status: DISCONTINUED | OUTPATIENT
Start: 2020-08-04 | End: 2020-08-04

## 2020-08-04 RX ORDER — OXYCODONE HYDROCHLORIDE 5 MG/1
5-10 TABLET ORAL EVERY 6 HOURS PRN
Qty: 25 TABLET | Refills: 0 | Status: SHIPPED | OUTPATIENT
Start: 2020-08-04 | End: 2022-06-23

## 2020-08-04 RX ORDER — ACETAMINOPHEN 325 MG/1
975 TABLET ORAL ONCE
Status: COMPLETED | OUTPATIENT
Start: 2020-08-04 | End: 2020-08-04

## 2020-08-04 RX ORDER — PROPOFOL 10 MG/ML
INJECTION, EMULSION INTRAVENOUS PRN
Status: DISCONTINUED | OUTPATIENT
Start: 2020-08-04 | End: 2020-08-04

## 2020-08-04 RX ORDER — ONDANSETRON 2 MG/ML
INJECTION INTRAMUSCULAR; INTRAVENOUS PRN
Status: DISCONTINUED | OUTPATIENT
Start: 2020-08-04 | End: 2020-08-04

## 2020-08-04 RX ORDER — SODIUM CHLORIDE, SODIUM LACTATE, POTASSIUM CHLORIDE, CALCIUM CHLORIDE 600; 310; 30; 20 MG/100ML; MG/100ML; MG/100ML; MG/100ML
INJECTION, SOLUTION INTRAVENOUS CONTINUOUS PRN
Status: DISCONTINUED | OUTPATIENT
Start: 2020-08-04 | End: 2020-08-04

## 2020-08-04 RX ORDER — NALOXONE HYDROCHLORIDE 0.4 MG/ML
.1-.4 INJECTION, SOLUTION INTRAMUSCULAR; INTRAVENOUS; SUBCUTANEOUS
Status: DISCONTINUED | OUTPATIENT
Start: 2020-08-04 | End: 2020-08-05 | Stop reason: HOSPADM

## 2020-08-04 RX ORDER — FENTANYL CITRATE 50 UG/ML
25-50 INJECTION, SOLUTION INTRAMUSCULAR; INTRAVENOUS
Status: DISCONTINUED | OUTPATIENT
Start: 2020-08-04 | End: 2020-08-04 | Stop reason: HOSPADM

## 2020-08-04 RX ORDER — ONDANSETRON 4 MG/1
4 TABLET, ORALLY DISINTEGRATING ORAL EVERY 6 HOURS PRN
Status: DISCONTINUED | OUTPATIENT
Start: 2020-08-04 | End: 2020-08-05 | Stop reason: HOSPADM

## 2020-08-04 RX ORDER — DIMENHYDRINATE 50 MG/ML
25 INJECTION, SOLUTION INTRAMUSCULAR; INTRAVENOUS
Status: DISCONTINUED | OUTPATIENT
Start: 2020-08-04 | End: 2020-08-04 | Stop reason: HOSPADM

## 2020-08-04 RX ORDER — SODIUM CHLORIDE, SODIUM LACTATE, POTASSIUM CHLORIDE, CALCIUM CHLORIDE 600; 310; 30; 20 MG/100ML; MG/100ML; MG/100ML; MG/100ML
INJECTION, SOLUTION INTRAVENOUS CONTINUOUS
Status: DISCONTINUED | OUTPATIENT
Start: 2020-08-04 | End: 2020-08-04 | Stop reason: HOSPADM

## 2020-08-04 RX ORDER — GINSENG 100 MG
CAPSULE ORAL 2 TIMES DAILY
Status: DISCONTINUED | OUTPATIENT
Start: 2020-08-04 | End: 2020-08-05 | Stop reason: HOSPADM

## 2020-08-04 RX ORDER — ONDANSETRON 4 MG/1
4 TABLET, ORALLY DISINTEGRATING ORAL EVERY 30 MIN PRN
Status: DISCONTINUED | OUTPATIENT
Start: 2020-08-04 | End: 2020-08-04 | Stop reason: HOSPADM

## 2020-08-04 RX ORDER — CALCIUM CARBONATE 500 MG/1
1000 TABLET, CHEWABLE ORAL 4 TIMES DAILY PRN
Status: DISCONTINUED | OUTPATIENT
Start: 2020-08-04 | End: 2020-08-05 | Stop reason: HOSPADM

## 2020-08-04 RX ORDER — HYDROMORPHONE HYDROCHLORIDE 1 MG/ML
.3-.5 INJECTION, SOLUTION INTRAMUSCULAR; INTRAVENOUS; SUBCUTANEOUS EVERY 10 MIN PRN
Status: DISCONTINUED | OUTPATIENT
Start: 2020-08-04 | End: 2020-08-04 | Stop reason: HOSPADM

## 2020-08-04 RX ORDER — ATROPA BELLADONNA AND OPIUM 16.2; 3 MG/1; MG/1
30 SUPPOSITORY RECTAL EVERY 6 HOURS PRN
Status: ACTIVE | OUTPATIENT
Start: 2020-08-04 | End: 2020-08-05

## 2020-08-04 RX ORDER — ACETAMINOPHEN 325 MG/1
650 TABLET ORAL EVERY 6 HOURS PRN
Status: DISCONTINUED | OUTPATIENT
Start: 2020-08-04 | End: 2020-08-05 | Stop reason: HOSPADM

## 2020-08-04 RX ORDER — CEFAZOLIN SODIUM 1 G/3ML
1 INJECTION, POWDER, FOR SOLUTION INTRAMUSCULAR; INTRAVENOUS EVERY 8 HOURS
Status: COMPLETED | OUTPATIENT
Start: 2020-08-04 | End: 2020-08-05

## 2020-08-04 RX ORDER — OXYCODONE HYDROCHLORIDE 5 MG/1
5 TABLET ORAL EVERY 6 HOURS PRN
Qty: 15 TABLET | Refills: 0 | Status: SHIPPED | OUTPATIENT
Start: 2020-08-04 | End: 2022-06-23

## 2020-08-04 RX ADMIN — ATORVASTATIN CALCIUM 10 MG: 10 TABLET, FILM COATED ORAL at 20:34

## 2020-08-04 RX ADMIN — GENTAMICIN SULFATE 220 MG: 40 INJECTION, SOLUTION INTRAMUSCULAR; INTRAVENOUS at 08:41

## 2020-08-04 RX ADMIN — FENTANYL CITRATE 50 MCG: 50 INJECTION, SOLUTION INTRAMUSCULAR; INTRAVENOUS at 09:34

## 2020-08-04 RX ADMIN — Medication 50 MCG: at 09:05

## 2020-08-04 RX ADMIN — OXYCODONE HYDROCHLORIDE 5 MG: 5 TABLET ORAL at 17:42

## 2020-08-04 RX ADMIN — OXYCODONE HYDROCHLORIDE 5 MG: 5 TABLET ORAL at 14:07

## 2020-08-04 RX ADMIN — FENTANYL CITRATE 50 MCG: 50 INJECTION, SOLUTION INTRAMUSCULAR; INTRAVENOUS at 08:47

## 2020-08-04 RX ADMIN — CEFAZOLIN 2 G: 10 INJECTION, POWDER, FOR SOLUTION INTRAVENOUS at 08:41

## 2020-08-04 RX ADMIN — LIDOCAINE HYDROCHLORIDE 100 MG: 20 INJECTION, SOLUTION INFILTRATION; PERINEURAL at 08:34

## 2020-08-04 RX ADMIN — ACETAMINOPHEN 975 MG: 325 TABLET ORAL at 07:35

## 2020-08-04 RX ADMIN — PROPOFOL 250 MG: 10 INJECTION, EMULSION INTRAVENOUS at 08:35

## 2020-08-04 RX ADMIN — OXYCODONE HYDROCHLORIDE 10 MG: 5 TABLET ORAL at 23:36

## 2020-08-04 RX ADMIN — OXYCODONE HYDROCHLORIDE 5 MG: 5 TABLET ORAL at 20:34

## 2020-08-04 RX ADMIN — ACETAMINOPHEN 650 MG: 325 TABLET, FILM COATED ORAL at 12:38

## 2020-08-04 RX ADMIN — DEXAMETHASONE SODIUM PHOSPHATE 4 MG: 4 INJECTION, SOLUTION INTRAMUSCULAR; INTRAVENOUS at 08:45

## 2020-08-04 RX ADMIN — CEFAZOLIN 1 G: 1 INJECTION, POWDER, FOR SOLUTION INTRAMUSCULAR; INTRAVENOUS at 17:17

## 2020-08-04 RX ADMIN — BACITRACIN ZINC: 500 OINTMENT TOPICAL at 23:05

## 2020-08-04 RX ADMIN — SODIUM CHLORIDE, POTASSIUM CHLORIDE, SODIUM LACTATE AND CALCIUM CHLORIDE: 600; 310; 30; 20 INJECTION, SOLUTION INTRAVENOUS at 08:34

## 2020-08-04 RX ADMIN — FENTANYL CITRATE 25 MCG: 50 INJECTION, SOLUTION INTRAMUSCULAR; INTRAVENOUS at 10:48

## 2020-08-04 RX ADMIN — FENTANYL CITRATE 50 MCG: 50 INJECTION, SOLUTION INTRAMUSCULAR; INTRAVENOUS at 10:55

## 2020-08-04 RX ADMIN — Medication 100 MCG: at 09:17

## 2020-08-04 RX ADMIN — ONDANSETRON 4 MG: 2 INJECTION INTRAMUSCULAR; INTRAVENOUS at 10:25

## 2020-08-04 RX ADMIN — HYDROMORPHONE HYDROCHLORIDE 0.3 MG: 1 INJECTION, SOLUTION INTRAMUSCULAR; INTRAVENOUS; SUBCUTANEOUS at 11:05

## 2020-08-04 ASSESSMENT — ACTIVITIES OF DAILY LIVING (ADL)
COGNITION: 0 - NO COGNITION ISSUES REPORTED
BATHING: 0-->INDEPENDENT
TOILETING: 0-->INDEPENDENT
AMBULATION: 0-->INDEPENDENT
RETIRED_EATING: 0-->INDEPENDENT
SWALLOWING: 0-->SWALLOWS FOODS/LIQUIDS WITHOUT DIFFICULTY
RETIRED_COMMUNICATION: 0-->UNDERSTANDS/COMMUNICATES WITHOUT DIFFICULTY
TRANSFERRING: 0-->INDEPENDENT
DRESS: 0-->INDEPENDENT
FALL_HISTORY_WITHIN_LAST_SIX_MONTHS: NO

## 2020-08-04 ASSESSMENT — MIFFLIN-ST. JEOR: SCORE: 1605.88

## 2020-08-04 NOTE — BRIEF OP NOTE
[facility]    Brief Operative Note    Pre-operative diagnosis: Erectile dysfunction, unspecified erectile dysfunction type [N52.9]  Post-operative diagnosis Same as pre-operative diagnosis    Procedure: Procedure(s):  PLACEMENT OF COLOPLAST PENILE PROSTHESIS  Surgeon: Surgeon(s) and Role:     * Yaron Shetty MD - Primary     * Little Agarwal MD - Resident - Assisting  Anesthesia: General   Estimated blood loss: 30 ccs  Drains: Olegario-Sy and 16 Fr martinez catheter  Specimens: * No specimens in log *  Findings:   None.  Complications: None.  Implants:   Implant Name Type Inv. Item Serial No.  Lot No. LRB No. Used Action   IMP PROSTHESIS PENILE TITAN STD ASSEMBLY KIT [id number] Penile Implant IMP PROSTHESIS PENILE TITAN STD ASSEMBLY KIT [id number]  COLOPLAST [id number] N/A 1 Implanted   Titan [employer] 125cc   [id number]  [id number] N/A 1 Implanted   IMP PROSTH PENILE TITAN CYL/PUMP SCROTAL 22CM [id number] Penile Implant IMP PROSTH PENILE TITAN CYL/PUMP SCROTAL 22CM [id number] COLOPLAST [id number] N/A 1 Implanted       Plan  Admit to urology, outpt in a bed  Device currently ACTIVATED. Will deactivate tomorrow.  Martinez out tomorrow  Drain out tomorrow if < 30 ccs/shift  Home with 1 wk TMP/SMX, 25 oxy 5s  Follow up in 2 weeks

## 2020-08-04 NOTE — ANESTHESIA CARE TRANSFER NOTE
Patient: Carlos Kingston    Procedure(s):  PLACEMENT OF COLOPLAST PENILE PROSTHESIS    Diagnosis: Erectile dysfunction, unspecified erectile dysfunction type [N52.9]  Diagnosis Additional Information: No value filed.    Anesthesia Type:   General     Note:  Airway :Nasal Cannula  Patient transferred to:PACU  Comments: Patient is Awake, VSS, following commands. Patient is breathing Spontaneously with nasal cannula. No obvious complications from anesthesia. Care report given to PACU RN, and notified of assigned Anesthesiology staff to patient for continuity of PACU care.     Christopher Peoples DO.  Anesthesiology Resident CA-2, PGY-3  Pager [phone]  Handoff Report: Identifed the Patient, Identified the Reponsible Provider, Reviewed the pertinent medical history, Discussed the surgical course, Reviewed Intra-OP anesthesia mangement and issues during anesthesia, Set expectations for post-procedure period and Allowed opportunity for questions and acknowledgement of understanding      Vitals: (Last set prior to Anesthesia Care Transfer)    CRNA VITALS  8/4/2020 1006 - 8/4/2020 1039      8/4/2020             Resp Rate (observed):  20                Electronically Signed By: Christopher Peoples DO  August 4, 2020  10:39 AM

## 2020-08-04 NOTE — PLAN OF CARE
"      VS:   /68 (BP Location: Left arm)   Pulse 64   Temp 97  F (36.1  C) (Oral)   Resp 16   Ht 1.651 m (5' 5\")   Wt 90.4 kg (199 lb 4.7 oz)   SpO2 93%   BMI 33.16 kg/m    Tyler SOB or CP. On 2L NC   Output:   Jeong in place good output, not passing gas, last BM 8/3, JEANNETTE drain    Activity: SBA   Skin: Intact ex incision and drain device   Pain: Managed with Tylenol and Oxycodone PRN   Neuro/CMS: A&O x4, denies n/t cms intact   Dressing(s): CDI   Diet: Regular, good appetite, denies nauses   LDA: PIV SL, michelle, JEANNETTE drain   Equipment: CAPNO, IV pole   Plan: Continue to monitor   Additional Info: Implant partially inflated, will deflate tomorrow morning       "

## 2020-08-04 NOTE — OP NOTE
OPERATIVE REPORT    PREOPERATIVE DIAGNOSIS: Erectile dysfunction    POSTOPERATIVE DIAGNOSIS: Same    PROCEDURES PERFORMED:   1. Implant inflatable penile prosthesis - Coloplast Titan classic 22 cm zero degree with no rear tip extenders.    STAFF SURGEON: Yaron Shetty MD    RESIDENT(S): Little Agarwal MD    ANESTHESIA: General    ESTIMATED BLOOD LOSS: 30 mL.     IV FLUIDS: See anesthesia record    COMPLICATIONS: None.     SIGNIFICANT FINDINGS: Proper fitting of implants equally with distal tips in mid glans.  125cc reservoir with 107cc in the right space of Retzius.    BRIEF OPERATIVE INDICATIONS: After full informed voluntary consent was obtained, the patient was transported to the operating room, placed supine on the table. A brief was held. Pneumo boots were applied, and a general anesthetic was induced without complication. A timeout was taken to confirm correct patient, procedure and laterality. The genitals were shaved, prepped and draped in the supine position using an alcohol based prep. A Andre (SKW) retractor was placed at the start of the case followed by a 16Fr martinez catheter without difficulty. 10cc of sterile water was placed in the balloon. A sharp retractor was placed in the distal dorsal urethra to retract the penis cephalad.      Next, we proceeded by making a penoscrotal incision transversely with a 15 blade scalpel, approximately 4 cm in length. We then bluntly dissected down to identify the corporal bodies. Two 2-0 PDS sutures were then placed vertically in each of the corporal bodies to aid in marking and holding the corpora, and a scalpel was used to incise the corpora. Metzenbaum scissors were then used to first gently dilate proximally down to the base of the corporal bodies and then distally to the mid glans bilaterally. We then used the Bergeron dilators starting at size 8 and increasing up to size 13 to dilate the tract proximally and distally to the glans easily.  The Roque  device was used to identify and measure the corporal lengths; measurements were 11 cm proximal and 11cm distal right while 11cm distal and 11 cm proximal left.  A 22+0 cylinder was selected.   Next, vancomycin/gentamicin irrigation as well as aseptic irrigation was used to irrigate each corporal cavity copiously. The Coloplast titan implant was then prepared on the backtable and soaked in rifampin/gentamycin antibiotic solution.     Next we digitally probed the right external ring and then exposed a right retropubic space to place the implant reservoir by elevating the ring with a baby [place] retractor and blunt finger dissection into the right space of Retzius. A 125cc reservoir was used with 107cc of sterile water inside. Care was taken to ensure the lock-out valve was positioned anteriorly.      A Coloplast titan inflatable implant, 22 cm zero degree was placed in each corpora. The tubing was positioned so as to avoid entanglement and then connected to the reservoir tubing. It inflated easily with proper filling and bilateral implants in the mid glans; no evidence of crossover, with symmetric and cosmetically ideal appearance to the erection. . The stay sutures of 2-0 PDS were tied over the corporotomies over the implants to complete the closure. Additional aseptic solution was used to washout the scrotal cavity.      We then turned our attention to fashioning a scrotal pouch. A finger was used to develop a space anterior to the testicles and the pump was gently positioned into the scrotal pouch, taking care to position the pump with the button facing out and the tubing not entangled.      For the peno-scrotal incision, two layers of dartos muscle were approximated using running 2-0 Vicryls, irrigating the incision copiously after closure of each skin layer. A 10 Greek round JEANNETTE drain was brought out the dependent portion of the left hemiscrotum, and the tip of this was positioned near the right external ring.  This was placed to bulb suction and secured to the skin using a nylon drain stitch. The skin was closed using a 4-0 monocryl running horizontal mattress suture. The prosthesis was left semi-inflated at the conclusion of the case. Bacitracin ointment and a kerlex mummy wrapping were placed around the penis and scrotum.     Postop plan:  - Admit to outpatient observation overnight  - Deflate implant tomorrow AM with pain medication beforehand  - Drain to be removed prior to discharge if output <30 ml per shift  - Follow up in 2 weeks    I was present and scrubbed for the entire procedure.  He had a routine IPP implant with excellent cosmetic and functional outcome.  Yaron Shetty MD  Urology Staff

## 2020-08-04 NOTE — OR NURSING
Dr. Yves Bruno of anesthesia notified patient meets pacu discharge criteria. Patient okay to transfer to floor per Dr. Marinelli. He is unable to come to bedside now, but will place sign out when able.

## 2020-08-04 NOTE — ANESTHESIA PREPROCEDURE EVALUATION
"Anesthesia Pre-Procedure Evaluation    Patient: Carlos Kingston   MRN:     [id number] Gender:   male   Age:    65 year old :      1955        Preoperative Diagnosis: Erectile dysfunction, unspecified erectile dysfunction type [N52.9]   Procedure(s):  PLACEMENT OF COLOPLAST PENILE PROSTHESIS     LABS:  CBC:   Lab Results   Component Value Date    WBC 7.8 2012    HGB 11.6 (L) 2019    HGB 11.7 (L) 2019    HCT 42.9 2012     2012     BMP:   Lab Results   Component Value Date     2016    POTASSIUM 3.6 2016    POTASSIUM 4.1 2016    CHLORIDE 108 2016    CO2 23 2016    BUN 19 2016    CR 1.06 2016    CR 1.00 2016     (H) 2016    GLC 93 2016     COAGS: No results found for: PTT, INR, FIBR  POC:   Lab Results   Component Value Date     (H) 2014     OTHER:   Lab Results   Component Value Date    VADIM 8.8 2016    ALBUMIN 4.2 2016    PROTTOTAL 6.7 (L) 2016    ALT 44 2016    AST 25 2016    ALKPHOS 63 2016    BILITOTAL 0.8 2016    CRP <5.0 2012    SED 4 2012        Preop Vitals    BP Readings from Last 3 Encounters:   19 136/80   11/15/19 (!) 140/80   19 145/84    Pulse Readings from Last 3 Encounters:   19 73   11/15/19 80   19 115      Resp Readings from Last 3 Encounters:   19 18   16 18   10/21/16 14    SpO2 Readings from Last 3 Encounters:   11/15/19 94%   19 91%   16 93%      Temp Readings from Last 1 Encounters:   19 36.6  C (97.8  F) (Oral)    Ht Readings from Last 1 Encounters:   19 1.676 m (5' 6\")      Wt Readings from Last 1 Encounters:   19 90.7 kg (200 lb)    Estimated body mass index is 32.28 kg/m  as calculated from the following:    Height as of 19: 1.676 m (5' 6\").    Weight as of 19: 90.7 kg (200 lb).     LDA:        Past Medical History:   Diagnosis " Date     Arthritis     Bilateral knees     Migraines      Osteoarthrosis      Other chronic pain     Migraine headaches      Past Surgical History:   Procedure Laterality Date     ANKLE SURGERY Right     Debridement     ARTHROPLASTY HIP Right 12/29/2014    Procedure: ARTHROPLASTY HIP;  Surgeon: Elliott Gonzalez MD;  Location: [facility] OR     ARTHROPLASTY KNEE BILATERAL Bilateral 5/6/2019    Procedure: 1.  Left total knee arthroplasty.  2.  Right total knee arthroplasty.;  Surgeon: Elliott Gonzalez MD;  Location: [facility] OR     ARTHROSCOPY KNEE Right      ARTHROSCOPY KNEE WITH MEDIAL MENISCECTOMY  11/20/2013    Procedure: ARTHROSCOPY KNEE WITH MEDIAL MENISCECTOMY;  RIGHT ARTHROSCOPY KNEE WITH MEDIAL MENISCECTOMY;  Surgeon: Elliott Gonzalez MD;  Location: [facility] OR     C TOTAL HIP ARTHROPLASTY Left      CARPAL TUNNEL RELEASE RT/LT Bilateral      COLONOSCOPY       DISCECTOMY, FUSION CERVICAL ANTERIOR TWO LEVELS, COMBINED N/A 12/29/2014    Procedure: COMBINED DISCECTOMY, FUSION CERVICAL ANTERIOR TWO LEVELS;  Surgeon: Mainor Adan MD;  Location: [facility] OR     ORTHOPEDIC SURGERY      right ankle arthroscopy     ORTHOPEDIC SURGERY      left hip replacment     ORTHOPEDIC SURGERY      bilateral carpal tunnel     SOFT TISSUE SURGERY      fatty lump removed rt. arm     VASECTOMY        No Known Allergies     Anesthesia Evaluation     . Pt has had prior anesthetic. Type: General and Regional    No history of anesthetic complications          ROS/MED HX    ENT/Pulmonary:  - neg pulmonary ROS     Neurologic:     (+)migraines,     Cardiovascular:  - neg cardiovascular ROS       METS/Exercise Tolerance:  >4 METS   Hematologic:  - neg hematologic  ROS       Musculoskeletal:   (+) arthritis,  -       GI/Hepatic:  - neg GI/hepatic ROS       Renal/Genitourinary: Comment: ED        Endo:  - neg endo ROS       Psychiatric:  - neg psychiatric ROS       Infectious Disease:  - neg infectious disease ROS       Malignancy:      - no malignancy    Other:    - neg other ROS                     PHYSICAL EXAM:   Mental Status/Neuro: A/A/O   Airway: Facies: Feasible  Mallampati: I  Mouth/Opening: Full  TM distance: > 6 cm  Neck ROM: Full   Respiratory: Auscultation: CTAB     Resp. Rate: Normal     Resp. Effort: Normal      CV: Rhythm: Regular  Rate: Age appropriate  Heart: Normal Sounds  Edema: None   Comments:      Dental: Normal Dentition                Assessment:   ASA SCORE: 2    H&P: History and physical reviewed and following examination; no interval change.    NPO Status: NPO Appropriate     Plan:   Anes. Type:  General   Pre-Medication: None   Induction:  IV (Standard)   Airway: LMA   Access/Monitoring: PIV   Maintenance: Balanced     Postop Plan:   Postop Pain: Opioids  Postop Sedation/Airway: Not planned  Disposition: Outpatient     PONV Management:   Adult Risk Factors:, Postop Opioids   Prevention: Ondansetron     CONSENT: Direct conversation   Plan and risks discussed with: Patient   Blood Products: Consent Deferred (Minimal Blood Loss)       Comments for Plan/Consent:  Spinal vs General w/ LMA                 Christopher Peoples DO

## 2020-08-04 NOTE — ANESTHESIA POSTPROCEDURE EVALUATION
Anesthesia POST Procedure Evaluation    Patient: Carlos Kingston   MRN:     [id number] Gender:   male   Age:    65 year old :      1955        Preoperative Diagnosis: Erectile dysfunction, unspecified erectile dysfunction type [N52.9]   Procedure(s):  PLACEMENT OF COLOPLAST PENILE PROSTHESIS   Postop Comments: No value filed.     Anesthesia Type: General          Postop Pain Control: Uneventful            Sign Out: Well controlled pain   PONV: No   Neuro/Psych: Uneventful            Sign Out: Acceptable/Baseline neuro status   Airway/Respiratory: Uneventful            Sign Out: Acceptable/Baseline resp. status   CV/Hemodynamics: Uneventful            Sign Out: Acceptable CV status   Other NRE: NONE   DID A NON-ROUTINE EVENT OCCUR? No         Last Anesthesia Record Vitals:  CRNA VITALS  2020 1006 - 2020 1106      2020             Resp Rate (observed):  20          Last PACU Vitals:  Vitals Value Taken Time   /74 2020  2:00 PM   Temp 35.7  C (96.3  F) 2020 12:03 PM   Pulse 61 2020 11:30 AM   Resp 15 2020  1:30 PM   SpO2 89 % 2020  2:00 PM   Temp src     NIBP     Pulse     SpO2     Resp     Temp     Ht Rate     Temp 2     Vitals shown include unvalidated device data.      Electronically Signed By: Damion Marinelli MD, 2020, 3:03 PM

## 2020-08-04 NOTE — OR NURSING
PACU to Inpatient Nursing Handoff    Patient Carlos Kingston is a 65 year old male who speaks English.   Procedure Procedure(s):  PLACEMENT OF COLOPLAST PENILE PROSTHESIS   Surgeon(s) Primary: Yaron Shetty MD  Resident - Assisting: Little Agarwal MD     No Known Allergies    Isolation  [unfilled]     Past Medical History   has a past medical history of Arthritis, Migraines, Osteoarthrosis, and Other chronic pain.    Anesthesia General   Dermatome Level     Preop Meds acetaminophen (Tylenol) - time given: 0735   Nerve block Not applicable   Intraop Meds dexamethasone (Decadron)  fentanyl (Sublimaze): 75 mcg total  ondansetron (Zofran): last given at 1025   Local Meds Yes   Antibiotics cefazolin (Ancef) - last given at 0841  gentamicin (Garamycin) - last given at 0841     Pain Patient Currently in Pain: yes  Comfort: comfortably manageable  Pain Control: partially effective   PACU meds  fentanyl (Sublimaze): 75 mcg (total dose) last given at 1055   hydromorphone (Dilaudid): 0.3 mg (total dose) last given at 1105    PCA / epidural No   Capnography Respiratory Monitoring (EtCO2): 42 mmHg  Integrated Pulmonary Index (IPI): 8-9   Telemetry ECG Rhythm: Sinus rhythm   Inpatient Telemetry Monitor Ordered? No        Labs Glucose Lab Results   Component Value Date     12/01/2016       Hgb Lab Results   Component Value Date    HGB 11.6 05/08/2019       INR No results found for: INR   PACU Imaging Not applicable     Wound/Incision Incision/Surgical Site 05/06/19 Bilateral Knee (Active)   Number of days: 456       Incision/Surgical Site 08/04/20 Penis (Active)   Incision Assessment UTV 08/04/20 1130   Closure SILVIA 08/04/20 1130   Incision Drainage Amount None 08/04/20 1130   Dressing Intervention Clean, dry, intact 08/04/20 1130   Number of days: 0      CMS  intact      Equipment Not applicable   Other LDA       IV Access Peripheral IV 08/04/20 Left Hand (Active)   Site Assessment WDL 08/04/20  1130   Line Status Infusing 08/04/20 1130   Phlebitis Scale 0-->no symptoms 08/04/20 1130   Infiltration Scale 0 08/04/20 1130   Infiltration Site Treatment Method  None 08/04/20 1130   Extravasation? No 08/04/20 1130   Number of days: 0      Blood Products Not applicable EBL 30 mL   Intake/Output Date 08/04/20 0700 - 08/05/20 0659   Shift 0927-6577 6468-0601 8308-1052 24 Hour Total   INTAKE   P.O. 240   240   I.V. 600   600   Shift Total(mL/kg) 840(9.29)   840(9.29)   OUTPUT   Drains 20   20   Blood 30   30   Shift Total(mL/kg) 50(0.55)   50(0.55)   Weight (kg) 90.4 90.4 90.4 90.4      Drains / Jeong Closed/Suction Drain 1 Left Other (Comment) Bulb 10 Moroccan (Active)   Site Description UTV 08/04/20 1130   Dressing Status Normal: Clean, Dry & Intact 08/04/20 1130   Drainage Appearance Bloody/Bright Red 08/04/20 1130   Status To bulb suction 08/04/20 1130   Output (ml) 20 ml 08/04/20 1127   Number of days: 0       Urethral Catheter Straight-tip;Double-lumen 16 fr (Active)   Catheter Care Done 08/04/20 1040   Collection Container Patent;Standard 08/04/20 1130   Securement Method Securing device (Describe) 08/04/20 1130   Rationale for Continued Use Strict 1-2 Hour I&O;/GI/GYN Pelvic Procedure 08/04/20 1130   Number of days: 0      Time of void PreOp Void Prior to Procedure: 0738 (08/04/20 0738)    PostOp      Diapered? No   Bladder Scan      mL(water) (08/04/20 1124)  water     Vitals    B/P: 119/74  T: 97  F (36.1  C)    Temp src: Oral  P:  Pulse: 64 (08/04/20 0500)    Heart Rate: 56 (08/04/20 1130)     R: 14  O2:  SpO2: 97 %    O2 Device: None (Room air) (08/04/20 1130)    Oxygen Delivery: 1 LPM (08/04/20 1115)         Family/support present none present, wife will visit when on floor   Patient belongings     Patient transported on cart   DC meds/scripts (obs/outpt) Yes, scripts. Oxycodone script in chart   Inpatient Pain Meds Released? Yes       Special needs/considerations artificial hip and knees  bilaterally    Tasks needing completion None       Yovani Costa  ASC [id number]

## 2020-08-04 NOTE — PLAN OF CARE
"  VS:    BP (!) 140/72 (BP Location: Left arm)   Pulse 64   Temp 96.3  F (35.7  C) (Oral)   Resp 17   Ht 1.651 m (5' 5\")   Wt 90.4 kg (199 lb 4.7 oz)   SpO2 90%   BMI 33.16 kg/m  VSS. No reports of SOB, CP, or dizziness. LS clear equal bilaterally, on RA   Output:    Void in martinez catheter, patent. Last BM 8/3/20, not passing gas yet    Activity:    Standby assist    Skin: Intact w/ the exception of incisions/drains/devices   Pain:    Moderate, manageable w/ tylenol q6h & oxy q3h    Neuro/CMS:    Intact    Dressing(s):    Penis: CDI     Diet:    Regular    Equipment:    CAPNO   IV's/Drains:    PIV L hand: infusing TKO  Martinez - patent    Plan:    Continue to monitor    Additional Info:    Implant partially on, will deflate tomorrow             "

## 2020-08-05 VITALS
RESPIRATION RATE: 18 BRPM | DIASTOLIC BLOOD PRESSURE: 65 MMHG | HEIGHT: 65 IN | WEIGHT: 199.3 LBS | HEART RATE: 73 BPM | SYSTOLIC BLOOD PRESSURE: 121 MMHG | OXYGEN SATURATION: 95 % | TEMPERATURE: 96.7 F | BODY MASS INDEX: 33.2 KG/M2

## 2020-08-05 PROCEDURE — 25000132 ZZH RX MED GY IP 250 OP 250 PS 637: Mod: GY | Performed by: STUDENT IN AN ORGANIZED HEALTH CARE EDUCATION/TRAINING PROGRAM

## 2020-08-05 PROCEDURE — 25000128 H RX IP 250 OP 636: Performed by: STUDENT IN AN ORGANIZED HEALTH CARE EDUCATION/TRAINING PROGRAM

## 2020-08-05 RX ORDER — HYDROMORPHONE HYDROCHLORIDE 1 MG/ML
0.5 INJECTION, SOLUTION INTRAMUSCULAR; INTRAVENOUS; SUBCUTANEOUS ONCE
Status: COMPLETED | OUTPATIENT
Start: 2020-08-05 | End: 2020-08-05

## 2020-08-05 RX ADMIN — OXYCODONE HYDROCHLORIDE 10 MG: 5 TABLET ORAL at 05:55

## 2020-08-05 RX ADMIN — OXYCODONE HYDROCHLORIDE 10 MG: 5 TABLET ORAL at 08:46

## 2020-08-05 RX ADMIN — CEFAZOLIN 1 G: 1 INJECTION, POWDER, FOR SOLUTION INTRAMUSCULAR; INTRAVENOUS at 00:40

## 2020-08-05 RX ADMIN — HYDROMORPHONE HYDROCHLORIDE 0.5 MG: 1 INJECTION, SOLUTION INTRAMUSCULAR; INTRAVENOUS; SUBCUTANEOUS at 06:10

## 2020-08-05 RX ADMIN — OXYCODONE HYDROCHLORIDE 10 MG: 5 TABLET ORAL at 02:51

## 2020-08-05 RX ADMIN — CEFAZOLIN 1 G: 1 INJECTION, POWDER, FOR SOLUTION INTRAMUSCULAR; INTRAVENOUS at 09:07

## 2020-08-05 NOTE — PLAN OF CARE
Pt doing well, has pain in R groin. Oxycodone covers it. Trial of void done 9:00, 240cc in, void 225cc, scanned 100. Urology res was informed. Tolerated reg diet, up independent. Discharged to home at 10:45. Teaching done on what to do if problems voiding. Pt has follow up appointments. Pt able to walk down to his ride. Took all belongings.

## 2020-08-05 NOTE — PLAN OF CARE
VSS, afebrile, A&Ox4, on 2 LPM, capno WNL, denies CP, SOB or dizziness, no nausea reported, tolerating oral fluids and meds without difficulty, incisional pain is manageable with prn 10mg oxycodone, JEANNETTE has sang output- 15ml, martinez output good- yellow clear, pt eagerly waiting to have martinez removed this am and pump deflated,  pt has not been OOB, shifting positions in bed, HOB lowered as needed, denies numbness or tingling, able to move all extremities, calm and cooperative, PIV- SL except for abx, PCDs in place, pt able to make needs known, contact precautions maintained for MRSA, rounded per unit routine, will continue to monitor and assist and follow POC.

## 2020-08-05 NOTE — PROGRESS NOTES
"Urology Daily Progress Note    24 hour events/Subjective:     - No acute events overnight   - Pain well controlled on current regimen   - Tolerating regular diet ; no nausea or vomiting   - Not yet passing flatus.   - Not yet ambulating.   - On 3LNC    O:  Vitals: /65 (BP Location: Left arm)   Pulse 68   Temp 97  F (36.1  C) (Oral)   Resp 18   Ht 1.651 m (5' 5\")   Wt 90.4 kg (199 lb 4.7 oz)   SpO2 93%   BMI 33.16 kg/m      General: Alert, interactive, in NAD  Resp: Non-labored breathing on 3LNC  Abdomen: Soft, nontender  Ext: Warm and well perfused  : penis and scrotum with mild ecchymosis, appropriately tender to palpation   Martinez: Clear yellow   Drain: Serosanguinous    I/O  UOP  1500//NR  Drain 50/30//5 ccs    Labs  Not ordered    Assessment/Plan  65 year old y/o male POD#1 s/p coloplast IPP insertion. Recovering well.     Note - plan changes for the day are in BOLD  NEURO Pain well controlled on oxycodone, tylenol   CV HDS.   PULM Aggressive pulmonary toilet and I/S. Wean NC as able   FEN/GI Tolerating diet.  Home on senna    JEANNETTE drain removed. Discontinue martinez catheter today, trial of void prior to discharge. Please fill bladder with 500 ccs via martinez with outflow clamped (or as much as pt tolerates.) Then pull catheter, allow pt to void, record output, and page urology on call with results (in and out.)   HEME No concerns   ID Afebrile, no leukocytosis.    Antibiotics: Completed periop antibiotics, Ancef while in house. Home on 7d course of bactrim   ENDO No issues   ACTIVITY Up as tolerated  Ambulate at least TID    PPx SCDs, ambulation   HOME RX ON HOLD none   DISPO Likely home today if voiding well, ambulating, off NC     To be discussed with Dr. Shetty    --    Little Agarwal MD  Urology PGY2        "

## 2020-08-05 NOTE — DISCHARGE SUMMARY
Discharge Summary     Carlos Kingston MRN# [id number]   YOB: 1955 Age: 65 year old     Date of Admission:  8/4/2020  Date of Discharge::  8/5/2020 10:44 AM  Admitting Physician:  Yaron Shetty MD  Discharge Physician:  Yaron Shetty MD  Primary Care Physician:         Alex Patel          Admission Diagnoses:   Erectile dysfunction, unspecified erectile dysfunction type [N52.9]          Discharge Diagnosis:   Same as above         Procedures:   : Procedure(s):  PLACEMENT OF COLOPLAST PENILE PROSTHESIS        Non-operative procedures:   None performed          Consultations:   None         Imaging Studies:     Results for orders placed or performed during the hospital encounter of 05/06/19   XR Knee Port Bilateral 1/2 Views    Narrative    PORTABLE TWO VIEWS BOTH KNEES   5/6/2019 11:01 AM    HISTORY: Bilateral knee arthroplasties.    COMPARISON: None.      Impression    IMPRESSION: There are surgical changes of bilateral total knee  arthroplasties. The hardware is intact with no fracture or other  complication seen. Anterior skin staples are present. No other  abnormality is demonstrated.      LASHON SEWELL MD            Medications Prior to Admission:     Medications Prior to Admission   Medication Sig Dispense Refill Last Dose     atorvastatin (LIPITOR) 10 MG tablet Take 1 tablet (10 mg) by mouth daily 90 tablet 1 Past Week at Unknown time     acetaminophen-caffeine (EXCEDRIN TENSION HEADACHE) 500-65 MG TABS Take 2 tablets by mouth every 6 hours as needed for mild pain               Discharge Medications:     Current Discharge Medication List      START taking these medications    Details   !! oxyCODONE (ROXICODONE) 5 MG tablet Take 1 tablet (5 mg) by mouth every 6 hours as needed for pain  Qty: 15 tablet, Refills: 0    Associated Diagnoses: Erectile dysfunction, unspecified erectile dysfunction type      !! oxyCODONE (ROXICODONE) 5 MG tablet  Take 1-2 tablets (5-10 mg) by mouth every 6 hours as needed for pain (Moderate to Severe)  Qty: 25 tablet, Refills: 0    Associated Diagnoses: Erectile dysfunction, unspecified erectile dysfunction type      senna-docusate (SENOKOT-S/PERICOLACE) 8.6-50 MG tablet Take 1-2 tablets by mouth 2 times daily Take while on oral narcotics to prevent or treat constipation.  Qty: 30 tablet, Refills: 0    Comments: While on narcotics  Associated Diagnoses: Erectile dysfunction, unspecified erectile dysfunction type      sulfamethoxazole-trimethoprim (BACTRIM DS) 800-160 MG tablet Take 1 tablet by mouth 2 times daily for 7 days  Qty: 14 tablet, Refills: 0    Associated Diagnoses: Erectile dysfunction, unspecified erectile dysfunction type       !! - Potential duplicate medications found. Please discuss with provider.      CONTINUE these medications which have NOT CHANGED    Details   atorvastatin (LIPITOR) 10 MG tablet Take 1 tablet (10 mg) by mouth daily  Qty: 90 tablet, Refills: 1    Associated Diagnoses: Hyperlipidemia LDL goal <130      acetaminophen-caffeine (EXCEDRIN TENSION HEADACHE) 500-65 MG TABS Take 2 tablets by mouth every 6 hours as needed for mild pain                 Brief History of Illness:   Reason for admission requiring a surgical or invasive procedure:   Erectile dysfunction, unspecified erectile dysfunction type [N52.9]   The patient underwent the following procedure(s):   See above   There were no immediate complications during this procedure.    Please refer to the full operative summary for details.           Hospital Course:   The patient's hospital course was unremarkable.  Carlos Kingston recovered as anticipated and experienced no post-operative complications.     On POD#0 patient was ambulating without assitance, tolerating the discharge diet, had pain controlled with PO medications to go home with, and requiring no IV medications or fluids. Patient was discharged home with appropriate contact  information, follow-up and instructions as seen below in the discharge paperwork.         Final Pathology Result:   Pending at time of discharge         Discharge Instructions and Follow-Up:     Discharge Procedure Orders   No lifting    Order Comments: Activity  - No strenuous exercise or sexual activity for 6 weeks.  - wait to activate the device for the two week appointment at a minimum  - No lifting, pushing, pulling more than 15 pounds for 4 weeks.   - Do not strain with bowel movements.  - Do not drive until you can press the brake pedal quickly and fully without pain.   - Do not operate a motor vehicle while taking narcotic pain medications.     Incisions  - you should wait to inflate your device until the 2 week follow up appointment; you should however feel your device pump within your scrotum daily.   - You may shower and get incisions wet starting 48 hrs after surgery.  - Do not scrub incisions or submerge wounds (aka, bath, pool, hot tub, ect.) for 2 weeks.   - Remove wound dressing 48 hours after surgery.   - you may notice small sutures in your scrotum over the incision. These will slowly dissolve and fall out in the coming weeks  - Leave incision open to air.  Cover with gauze only if needed for comfort or to protect clothing from drainage.     Medications.  - Do not take more than 4,000mg of Tylenol (acetaminophen) in any 24 hour period, as this can cause liver damage.  - Take stool softeners such as Senna while you are using narcotics, but stop if you develop diarrhea.   - Wean yourself off of narcotic pain medications by transitioning to tylenol keeping in mind the total amount of acetaminophen ingested as listed above.  - take your antibiotics for the next 7 days as prescribed  - use bacitracin on your incision    Follow-Up:  - Follow up with Dr. Shetty in 2 weeks for wound check  - Call your primary care provider to touch base regarding your recent admission.     - Call or return sooner than your  regularly scheduled visit if you develop any of the following:  fever, uncontrolled pain, uncontrolled nausea or vomiting, as well as increased redness, swelling, or drainage from your wound.  You may call the Urology Clinic during daytime hours at [phone].  If after hours, call [phone] and ask to speak with the Urology resident on call.            Discharge Disposition:     Discharged to Home      Condition at discharge: Good    --    Little Agarwal MD  Urology Resident    8:13 AM, 8/5/2020

## 2020-08-12 ENCOUNTER — PRE VISIT (OUTPATIENT)
Dept: UROLOGY | Facility: CLINIC | Age: 65
End: 2020-08-12

## 2020-08-12 NOTE — TELEPHONE ENCOUNTER
Reason for Visit: post operative check up S/P implant of IPP    Diagnosis: Erectile Dysfunction    Orders/Procedures/Records: in system    Contact Patient: n/a    Rooming Requirements: joy Grove LPN  08/12/20  1:14 PM

## 2020-08-21 ENCOUNTER — OFFICE VISIT (OUTPATIENT)
Dept: UROLOGY | Facility: CLINIC | Age: 65
End: 2020-08-21
Payer: MEDICARE

## 2020-08-21 VITALS — BODY MASS INDEX: 32.99 KG/M2 | WEIGHT: 198 LBS | HEIGHT: 65 IN

## 2020-08-21 DIAGNOSIS — Z09 POSTOP CHECK: ICD-10-CM

## 2020-08-21 DIAGNOSIS — N52.9 ERECTILE DYSFUNCTION, UNSPECIFIED ERECTILE DYSFUNCTION TYPE: Primary | ICD-10-CM

## 2020-08-21 ASSESSMENT — MIFFLIN-ST. JEOR: SCORE: 1600

## 2020-08-21 ASSESSMENT — PAIN SCALES - GENERAL: PAINLEVEL: NO PAIN (0)

## 2020-08-21 NOTE — PROGRESS NOTES
"CC: Carlos Kingston  is post-op from IPP, Titan Classic, done on 8/4/20  HPI: Patient is 2 wks post-op.  He has been doing well. No fevers or chills. Pain decreasing.     Exam: Ht 1.651 m (5' 5\")   Wt 89.8 kg (198 lb)   BMI 32.95 kg/m   . NAD.   Incision healing well. No discharge, erythema or fluctuence suggestive of infection.   Penis is deflated , pump is easily palpable in the anterior scrotum.  Model pump given.      PLAN:     Instructed him not to activate the device, to continue to feel for the pump. Use bacitracin on the incision.    Return to clinic 3 weeks for activation.    Abena HENSLEY            "

## 2020-08-21 NOTE — LETTER
"8/21/2020       RE: Carlos Kingston  [address]  [facility] [place] [address]     Dear Colleague,    Thank you for referring your patient, Carlos Kingston, to the [facility] UROLOGY AND INST FOR PROSTATE AND UROLOGIC CANCERS at [facility]. Please see a copy of my visit note below.    CC: Carlos Kingston  is post-op from IPP, Titan Classic, done on 8/4/20  HPI: Patient is 2 wks post-op.  He has been doing well. No fevers or chills. Pain decreasing.     Exam: Ht 1.651 m (5' 5\")   Wt 89.8 kg (198 lb)   BMI 32.95 kg/m   . NAD.   Incision healing well. No discharge, erythema or fluctuence suggestive of infection.   Penis is deflated , pump is easily palpable in the anterior scrotum.  Model pump given.      PLAN:     Instructed him not to activate the device, to continue to feel for the pump. Use bacitracin on the incision.    Return to clinic 3 weeks for activation.    Abena HENSLEY       "

## 2020-08-21 NOTE — PATIENT INSTRUCTIONS
Please follow up in 3 week     It was a pleasure meeting with you today.  Thank you for allowing me and my team the privilege of caring for you today.  YOU are the reason we are here, and I truly hope we provided you with the excellent service you deserve.  Please let us know if there is anything else we can do for you so that we can be sure you are leaving completely satisfied with your care experience.

## 2020-09-02 ENCOUNTER — PRE VISIT (OUTPATIENT)
Dept: UROLOGY | Facility: CLINIC | Age: 65
End: 2020-09-02

## 2020-09-02 NOTE — TELEPHONE ENCOUNTER
Reason for Visit: activation of IPP     Diagnosis: Erectile Dysfunction     Orders/Procedures/Records: in system     Contact Patient: n/a     Rooming Requirements: normal      Kim Grove LPN  09/02/20  12:02 PM

## 2020-09-11 ENCOUNTER — OFFICE VISIT (OUTPATIENT)
Dept: UROLOGY | Facility: CLINIC | Age: 65
End: 2020-09-11
Payer: MEDICARE

## 2020-09-11 VITALS
BODY MASS INDEX: 32.99 KG/M2 | DIASTOLIC BLOOD PRESSURE: 68 MMHG | WEIGHT: 198 LBS | HEART RATE: 73 BPM | SYSTOLIC BLOOD PRESSURE: 145 MMHG | HEIGHT: 65 IN

## 2020-09-11 DIAGNOSIS — Z09 POSTOP CHECK: Primary | ICD-10-CM

## 2020-09-11 ASSESSMENT — PAIN SCALES - GENERAL: PAINLEVEL: NO PAIN (0)

## 2020-09-11 ASSESSMENT — MIFFLIN-ST. JEOR: SCORE: 1600

## 2020-09-11 NOTE — NURSING NOTE
"Chief Complaint   Patient presents with     Follow Up     activation of IPP       Blood pressure (!) 145/68, pulse 73, height 1.651 m (5' 5\"), weight 89.8 kg (198 lb). Body mass index is 32.95 kg/m .    Patient Active Problem List   Diagnosis     S/P total hip arthroplasty     Hyperlipidemia LDL goal <130     Status post total bilateral knee replacement     Erectile dysfunction, unspecified erectile dysfunction type     Erectile dysfunction       No Known Allergies    Current Outpatient Medications   Medication Sig Dispense Refill     atorvastatin (LIPITOR) 10 MG tablet Take 1 tablet (10 mg) by mouth daily 90 tablet 1     acetaminophen-caffeine (EXCEDRIN TENSION HEADACHE) 500-65 MG TABS Take 2 tablets by mouth every 6 hours as needed for mild pain       oxyCODONE (ROXICODONE) 5 MG tablet Take 1 tablet (5 mg) by mouth every 6 hours as needed for pain (Patient not taking: Reported on 9/11/2020) 15 tablet 0     oxyCODONE (ROXICODONE) 5 MG tablet Take 1-2 tablets (5-10 mg) by mouth every 6 hours as needed for pain (Moderate to Severe) (Patient not taking: Reported on 9/11/2020) 25 tablet 0     senna-docusate (SENOKOT-S/PERICOLACE) 8.6-50 MG tablet Take 1-2 tablets by mouth 2 times daily Take while on oral narcotics to prevent or treat constipation. (Patient not taking: Reported on 9/11/2020) 30 tablet 0       Social History     Tobacco Use     Smoking status: Never Smoker     Smokeless tobacco: Never Used   Substance Use Topics     Alcohol use: Yes     Comment: 3-4 drinks/week     Drug use: No       Kim Grove LPN  9/11/2020  7:06 AM  "

## 2020-09-11 NOTE — LETTER
"9/11/2020       RE: Carlos Kingston  [address]  [facility] [place] [address]     Dear Colleague,    Thank you for referring your patient, Carlos Kingston, to the [facility] UROLOGY AND INST FOR PROSTATE AND UROLOGIC CANCERS at [facility]. Please see a copy of my visit note below.    CC: Carlos Kingston is post-op from IPP, Titan classic, done on 8/4/20.  HPI: Patient is 5 wks post-op.  He has been doing well. No fevers or chills. Pain decreasing.   Exam: BP (!) 145/68   Pulse 73   Ht 1.651 m (5' 5\")   Wt 89.8 kg (198 lb)   BMI 32.95 kg/m   . NAD.   Incision healing well. No discharge, erythema or fluctuence suggestive of infection.   Penis is deflated , pump is palpable in the anterior scrotum.  Device cycles fully.  I instructed him how to activate the implant. He will follow-up as needed.  There is slight swelling around the pump, but I could activate it and feel the deflation bars without problem.  Exam will likely improve with more time.      PLAN:     Activate device 1-2x/day over the next week.  If this goes, OK, he is cleared for intercourse next week, (6 weeks post-op)    PRN follow-up with urology..    Abena HENSLEY      "

## 2020-09-11 NOTE — PROGRESS NOTES
"CC: Carlos Kingston is post-op from IPP, Titan classic, done on 8/4/20.  HPI: Patient is 5 wks post-op.  He has been doing well. No fevers or chills. Pain decreasing.   Exam: BP (!) 145/68   Pulse 73   Ht 1.651 m (5' 5\")   Wt 89.8 kg (198 lb)   BMI 32.95 kg/m   . NAD.   Incision healing well. No discharge, erythema or fluctuence suggestive of infection.   Penis is deflated , pump is palpable in the anterior scrotum.  Device cycles fully.  I instructed him how to activate the implant. He will follow-up as needed.  There is slight swelling around the pump, but I could activate it and feel the deflation bars without problem.  Exam will likely improve with more time.      PLAN:     Activate device 1-2x/day over the next week.  If this goes, OK, he is cleared for intercourse next week, (6 weeks post-op)    PRN follow-up with urology..    Abena HENSLEY            "

## 2022-05-16 ENCOUNTER — MEDICAL CORRESPONDENCE (OUTPATIENT)
Dept: HEALTH INFORMATION MANAGEMENT | Facility: CLINIC | Age: 67
End: 2022-05-16

## 2022-06-02 ENCOUNTER — TRANSFERRED RECORDS (OUTPATIENT)
Dept: MULTI SPECIALTY CLINIC | Facility: CLINIC | Age: 67
End: 2022-06-02

## 2022-06-02 LAB
CREATININE (EXTERNAL): 1.1 MG/DL (ref 0.8–1.5)
GFR ESTIMATED (EXTERNAL): 69 ML/MIN (ref 60–137)
GLUCOSE (EXTERNAL): 120 MG/DL (ref 70–110)
POTASSIUM (EXTERNAL): 4.8 MMOL/L (ref 3.5–5.5)

## 2022-06-23 ENCOUNTER — LAB (OUTPATIENT)
Dept: LAB | Facility: CLINIC | Age: 67
End: 2022-06-23
Payer: MEDICARE

## 2022-06-23 DIAGNOSIS — Z20.822 ENCOUNTER FOR LABORATORY TESTING FOR COVID-19 VIRUS: ICD-10-CM

## 2022-06-23 LAB — SARS-COV-2 RNA RESP QL NAA+PROBE: NEGATIVE

## 2022-06-23 PROCEDURE — U0003 INFECTIOUS AGENT DETECTION BY NUCLEIC ACID (DNA OR RNA); SEVERE ACUTE RESPIRATORY SYNDROME CORONAVIRUS 2 (SARS-COV-2) (CORONAVIRUS DISEASE [COVID-19]), AMPLIFIED PROBE TECHNIQUE, MAKING USE OF HIGH THROUGHPUT TECHNOLOGIES AS DESCRIBED BY CMS-2020-01-R: HCPCS

## 2022-06-23 PROCEDURE — U0005 INFEC AGEN DETEC AMPLI PROBE: HCPCS

## 2022-06-23 RX ORDER — HYDROCHLOROTHIAZIDE 12.5 MG/1
12.5 TABLET ORAL DAILY
COMMUNITY

## 2022-06-23 RX ORDER — ALBUTEROL SULFATE 90 UG/1
1-2 AEROSOL, METERED RESPIRATORY (INHALATION) EVERY 6 HOURS PRN
COMMUNITY
End: 2022-06-24

## 2022-06-23 RX ORDER — AMLODIPINE BESYLATE 5 MG/1
10 TABLET ORAL DAILY
COMMUNITY

## 2022-06-23 RX ORDER — AMOXICILLIN 500 MG/1
2000 CAPSULE ORAL
COMMUNITY

## 2022-06-24 NOTE — PROGRESS NOTES
PTA medications updated by Medication Scribe prior to surgery via phone call with patient (last doses completed by Nurse)     Medication history sources: Patient, Surescripts and H&P  In the past week, patient estimated taking medication this percent of the time: Greater than 90%  Adherence assessment: N/A Not Observed    Significant changes made to the medication list:  Patient reports no longer taking the following meds (med scribe removed from PTA med list): Albuterol Inhaler      Additional medication history information:   None    Medication reconciliation completed by provider prior to medication history? No    Time spent in this activity: 30 minutes    The information provided in this note is only as accurate as the sources available at the time of update(s)    Prior to Admission medications    Medication Sig Last Dose Taking? Auth Provider Long Term End Date   acetaminophen-caffeine (EXCEDRIN TENSION HEADACHE) 500-65 MG TABS Take 2 tablets by mouth every 6 hours as needed for mild pain  at prn Yes Reported, Patient     amLODIPine (NORVASC) 5 MG tablet Take 10 mg by mouth daily (2 x 5 mg = 10 mg)  at am Yes Reported, Patient Yes    amoxicillin (AMOXIL) 500 MG capsule Take 2,000 mg by mouth once as needed (one hour prior to dental appointment 4 x 500 mg = 2000 mg)  at prn Yes Reported, Patient     atorvastatin (LIPITOR) 10 MG tablet Take 1 tablet (10 mg) by mouth daily  at am Yes Trudy King MD Yes    Ergocalciferol (VITAMIN D2 PO) Take 1-2 capsules by mouth daily 6/24/2022 at am Yes Reported, Patient     hydrochlorothiazide (HYDRODIURIL) 12.5 MG tablet Take 12.5 mg by mouth daily  at am Yes Reported, Patient Yes      Medication history completed by:    Trip Amaodr CPhT  Medication Scribe  [facility]

## 2022-06-27 ENCOUNTER — HOSPITAL ENCOUNTER (OUTPATIENT)
Facility: CLINIC | Age: 67
Discharge: HOME | End: 2022-06-27
Attending: THORACIC SURGERY (CARDIOTHORACIC VASCULAR SURGERY) | Admitting: THORACIC SURGERY (CARDIOTHORACIC VASCULAR SURGERY)
Payer: MEDICARE

## 2022-06-27 LAB
ABO/RH(D): NORMAL
ANION GAP SERPL CALCULATED.3IONS-SCNC: 6 MMOL/L (ref 3–14)
ANTIBODY SCREEN: NEGATIVE
BUN SERPL-MCNC: 20 MG/DL (ref 7–30)
CALCIUM SERPL-MCNC: 9.1 MG/DL (ref 8.5–10.1)
CHLORIDE BLD-SCNC: 108 MMOL/L (ref 94–109)
CO2 SERPL-SCNC: 26 MMOL/L (ref 20–32)
CREAT SERPL-MCNC: 1.16 MG/DL (ref 0.66–1.25)
ERYTHROCYTE [DISTWIDTH] IN BLOOD BY AUTOMATED COUNT: 12.6 % (ref 10–15)
GFR SERPL CREATININE-BSD FRML MDRD: 68 ML/MIN/1.73M2
GLUCOSE BLD-MCNC: 127 MG/DL (ref 70–99)
HCT VFR BLD AUTO: 43.1 % (ref 40–53)
HGB BLD-MCNC: 14.6 G/DL (ref 13.3–17.7)
INR PPP: 0.99 (ref 0.85–1.15)
MCH RBC QN AUTO: 30.9 PG (ref 26.5–33)
MCHC RBC AUTO-ENTMCNC: 33.9 G/DL (ref 31.5–36.5)
MCV RBC AUTO: 91 FL (ref 78–100)
PLATELET # BLD AUTO: 342 10E3/UL (ref 150–450)
POTASSIUM BLD-SCNC: 3.8 MMOL/L (ref 3.4–5.3)
RBC # BLD AUTO: 4.73 10E6/UL (ref 4.4–5.9)
SODIUM SERPL-SCNC: 140 MMOL/L (ref 133–144)
SPECIMEN EXPIRATION DATE: NORMAL
WBC # BLD AUTO: 9.5 10E3/UL (ref 4–11)

## 2022-06-27 PROCEDURE — 999N000141 HC STATISTIC PRE-PROCEDURE NURSING ASSESSMENT: Performed by: THORACIC SURGERY (CARDIOTHORACIC VASCULAR SURGERY)

## 2022-06-27 PROCEDURE — 86850 RBC ANTIBODY SCREEN: CPT | Performed by: THORACIC SURGERY (CARDIOTHORACIC VASCULAR SURGERY)

## 2022-06-27 PROCEDURE — 85610 PROTHROMBIN TIME: CPT | Performed by: THORACIC SURGERY (CARDIOTHORACIC VASCULAR SURGERY)

## 2022-06-27 PROCEDURE — 86901 BLOOD TYPING SEROLOGIC RH(D): CPT | Performed by: THORACIC SURGERY (CARDIOTHORACIC VASCULAR SURGERY)

## 2022-06-27 PROCEDURE — 36415 COLL VENOUS BLD VENIPUNCTURE: CPT | Performed by: THORACIC SURGERY (CARDIOTHORACIC VASCULAR SURGERY)

## 2022-06-27 PROCEDURE — 85027 COMPLETE CBC AUTOMATED: CPT | Performed by: THORACIC SURGERY (CARDIOTHORACIC VASCULAR SURGERY)

## 2022-06-27 PROCEDURE — 80048 BASIC METABOLIC PNL TOTAL CA: CPT | Performed by: THORACIC SURGERY (CARDIOTHORACIC VASCULAR SURGERY)

## 2022-06-27 RX ORDER — CEFAZOLIN SODIUM/WATER 2 G/20 ML
2 SYRINGE (ML) INTRAVENOUS SEE ADMIN INSTRUCTIONS
Status: DISCONTINUED | OUTPATIENT
Start: 2022-06-27 | End: 2022-06-27 | Stop reason: HOSPADM

## 2022-06-27 RX ORDER — CEFAZOLIN SODIUM/WATER 2 G/20 ML
2 SYRINGE (ML) INTRAVENOUS
Status: DISCONTINUED | OUTPATIENT
Start: 2022-06-27 | End: 2022-06-27 | Stop reason: HOSPADM

## 2022-06-27 RX ORDER — SODIUM CHLORIDE, SODIUM LACTATE, POTASSIUM CHLORIDE, CALCIUM CHLORIDE 600; 310; 30; 20 MG/100ML; MG/100ML; MG/100ML; MG/100ML
INJECTION, SOLUTION INTRAVENOUS CONTINUOUS
Status: DISCONTINUED | OUTPATIENT
Start: 2022-06-27 | End: 2022-06-27 | Stop reason: HOSPADM

## 2022-06-27 NOTE — OR NURSING
Surgery cancelled by Dr. Reveles. OR jeanine called and informed charge nurse. Pt and wife notified. Informed pt to call Dr. Reveles office and reschedule the surgery per information from OR lidak

## 2022-06-28 ENCOUNTER — LAB (OUTPATIENT)
Dept: LAB | Facility: CLINIC | Age: 67
End: 2022-06-28
Payer: MEDICARE

## 2022-06-28 DIAGNOSIS — Z20.822 ENCOUNTER FOR LABORATORY TESTING FOR COVID-19 VIRUS: ICD-10-CM

## 2022-06-28 LAB — SARS-COV-2 RNA RESP QL NAA+PROBE: NEGATIVE

## 2022-06-28 PROCEDURE — U0005 INFEC AGEN DETEC AMPLI PROBE: HCPCS

## 2022-06-28 PROCEDURE — U0003 INFECTIOUS AGENT DETECTION BY NUCLEIC ACID (DNA OR RNA); SEVERE ACUTE RESPIRATORY SYNDROME CORONAVIRUS 2 (SARS-COV-2) (CORONAVIRUS DISEASE [COVID-19]), AMPLIFIED PROBE TECHNIQUE, MAKING USE OF HIGH THROUGHPUT TECHNOLOGIES AS DESCRIBED BY CMS-2020-01-R: HCPCS

## 2022-06-30 ENCOUNTER — ANESTHESIA EVENT (OUTPATIENT)
Dept: SURGERY | Facility: CLINIC | Age: 67
DRG: 165 | End: 2022-06-30
Payer: MEDICARE

## 2022-06-30 NOTE — PROGRESS NOTES
PTA medications updated by Medication Scribe prior to surgery via phone call with patient (last doses completed by Nurse)     Medication history sources: Patient, Surescripts and H&P  In the past week, patient estimated taking medication this percent of the time: Greater than 90%  Adherence assessment: N/A Not Observed    Significant changes made to the medication list:  None      Additional medication history information:   None    Medication reconciliation completed by provider prior to medication history? No    Time spent in this activity: 30 minutes    The information provided in this note is only as accurate as the sources available at the time of update(s)    Prior to Admission medications    Medication Sig Last Dose Taking? Auth Provider Long Term End Date   acetaminophen-caffeine (EXCEDRIN TENSION HEADACHE) 500-65 MG TABS Take 2 tablets by mouth every 6 hours as needed for mild pain  at PRN Yes Reported, Patient     amLODIPine (NORVASC) 5 MG tablet Take 10 mg by mouth daily (2 x 5 mg = 10 mg)  at AM Yes Reported, Patient Yes    amoxicillin (AMOXIL) 500 MG capsule Take 2,000 mg by mouth once as needed (one hour prior to dental appointment 4 x 500 mg = 2000 mg)  at PRN Yes Reported, Patient     atorvastatin (LIPITOR) 10 MG tablet Take 1 tablet (10 mg) by mouth daily  at AM Yes Trudy King MD Yes    Ergocalciferol (VITAMIN D2 PO) Take 1-2 capsules by mouth daily 6/29/2022 at AM Yes Reported, Patient     hydrochlorothiazide (HYDRODIURIL) 12.5 MG tablet Take 12.5 mg by mouth daily 6/30/2022 at AM Yes Reported, Patient Yes      Medication history completed by:    Trip Amador CPhT  Medication Scribnato  [facility]

## 2022-07-01 ENCOUNTER — ANESTHESIA (OUTPATIENT)
Dept: SURGERY | Facility: CLINIC | Age: 67
DRG: 165 | End: 2022-07-01
Payer: MEDICARE

## 2022-07-01 ENCOUNTER — APPOINTMENT (OUTPATIENT)
Dept: GENERAL RADIOLOGY | Facility: CLINIC | Age: 67
DRG: 165 | End: 2022-07-01
Attending: THORACIC SURGERY (CARDIOTHORACIC VASCULAR SURGERY)
Payer: MEDICARE

## 2022-07-01 ENCOUNTER — HOSPITAL ENCOUNTER (INPATIENT)
Facility: CLINIC | Age: 67
LOS: 3 days | Discharge: HOME | DRG: 165 | End: 2022-07-04
Attending: THORACIC SURGERY (CARDIOTHORACIC VASCULAR SURGERY) | Admitting: THORACIC SURGERY (CARDIOTHORACIC VASCULAR SURGERY)
Payer: MEDICARE

## 2022-07-01 DIAGNOSIS — Z98.890 S/P THORACOTOMY: Primary | ICD-10-CM

## 2022-07-01 LAB
ABO/RH(D): NORMAL
ANION GAP SERPL CALCULATED.3IONS-SCNC: 6 MMOL/L (ref 3–14)
ANION GAP SERPL CALCULATED.3IONS-SCNC: 7 MMOL/L (ref 3–14)
ANTIBODY SCREEN: NEGATIVE
BUN SERPL-MCNC: 20 MG/DL (ref 7–30)
BUN SERPL-MCNC: 21 MG/DL (ref 7–30)
CALCIUM SERPL-MCNC: 8.6 MG/DL (ref 8.5–10.1)
CALCIUM SERPL-MCNC: 8.7 MG/DL (ref 8.5–10.1)
CHLORIDE BLD-SCNC: 109 MMOL/L (ref 94–109)
CHLORIDE BLD-SCNC: 111 MMOL/L (ref 94–109)
CO2 SERPL-SCNC: 23 MMOL/L (ref 20–32)
CO2 SERPL-SCNC: 24 MMOL/L (ref 20–32)
CREAT SERPL-MCNC: 1.08 MG/DL (ref 0.66–1.25)
CREAT SERPL-MCNC: 1.11 MG/DL (ref 0.66–1.25)
ERYTHROCYTE [DISTWIDTH] IN BLOOD BY AUTOMATED COUNT: 12.6 % (ref 10–15)
GFR SERPL CREATININE-BSD FRML MDRD: 72 ML/MIN/1.73M2
GFR SERPL CREATININE-BSD FRML MDRD: 74 ML/MIN/1.73M2
GLUCOSE BLD-MCNC: 119 MG/DL (ref 70–99)
GLUCOSE BLD-MCNC: 163 MG/DL (ref 70–99)
HCT VFR BLD AUTO: 42.6 % (ref 40–53)
HGB BLD-MCNC: 14.2 G/DL (ref 13.3–17.7)
INR PPP: 0.99 (ref 0.85–1.15)
MCH RBC QN AUTO: 31.3 PG (ref 26.5–33)
MCHC RBC AUTO-ENTMCNC: 33.3 G/DL (ref 31.5–36.5)
MCV RBC AUTO: 94 FL (ref 78–100)
PLATELET # BLD AUTO: 340 10E3/UL (ref 150–450)
PLATELET # BLD AUTO: 375 10E3/UL (ref 150–450)
POTASSIUM BLD-SCNC: 4 MMOL/L (ref 3.4–5.3)
POTASSIUM BLD-SCNC: 4.2 MMOL/L (ref 3.4–5.3)
RBC # BLD AUTO: 4.54 10E6/UL (ref 4.4–5.9)
SODIUM SERPL-SCNC: 139 MMOL/L (ref 133–144)
SODIUM SERPL-SCNC: 141 MMOL/L (ref 133–144)
SPECIMEN EXPIRATION DATE: NORMAL
WBC # BLD AUTO: 7.4 10E3/UL (ref 4–11)

## 2022-07-01 PROCEDURE — 85027 COMPLETE CBC AUTOMATED: CPT | Performed by: THORACIC SURGERY (CARDIOTHORACIC VASCULAR SURGERY)

## 2022-07-01 PROCEDURE — 36415 COLL VENOUS BLD VENIPUNCTURE: CPT | Performed by: THORACIC SURGERY (CARDIOTHORACIC VASCULAR SURGERY)

## 2022-07-01 PROCEDURE — 250N000011 HC RX IP 250 OP 636: Performed by: THORACIC SURGERY (CARDIOTHORACIC VASCULAR SURGERY)

## 2022-07-01 PROCEDURE — 71045 X-RAY EXAM CHEST 1 VIEW: CPT

## 2022-07-01 PROCEDURE — 250N000011 HC RX IP 250 OP 636: Performed by: SURGERY

## 2022-07-01 PROCEDURE — 999N000063 XR CHEST PORT 1 VIEW

## 2022-07-01 PROCEDURE — 82310 ASSAY OF CALCIUM: CPT | Performed by: PHYSICIAN ASSISTANT

## 2022-07-01 PROCEDURE — 120N000001 HC R&B MED SURG/OB

## 2022-07-01 PROCEDURE — 258N000003 HC RX IP 258 OP 636: Performed by: PHYSICIAN ASSISTANT

## 2022-07-01 PROCEDURE — 999N000141 HC STATISTIC PRE-PROCEDURE NURSING ASSESSMENT: Performed by: THORACIC SURGERY (CARDIOTHORACIC VASCULAR SURGERY)

## 2022-07-01 PROCEDURE — 710N000010 HC RECOVERY PHASE 1, LEVEL 2, PER MIN: Performed by: THORACIC SURGERY (CARDIOTHORACIC VASCULAR SURGERY)

## 2022-07-01 PROCEDURE — 88307 TISSUE EXAM BY PATHOLOGIST: CPT | Mod: TC | Performed by: THORACIC SURGERY (CARDIOTHORACIC VASCULAR SURGERY)

## 2022-07-01 PROCEDURE — 999N000157 HC STATISTIC RCP TIME EA 10 MIN

## 2022-07-01 PROCEDURE — 0BTD0ZZ RESECTION OF RIGHT MIDDLE LUNG LOBE, OPEN APPROACH: ICD-10-PCS | Performed by: THORACIC SURGERY (CARDIOTHORACIC VASCULAR SURGERY)

## 2022-07-01 PROCEDURE — 86850 RBC ANTIBODY SCREEN: CPT | Performed by: THORACIC SURGERY (CARDIOTHORACIC VASCULAR SURGERY)

## 2022-07-01 PROCEDURE — 250N000011 HC RX IP 250 OP 636: Performed by: ANESTHESIOLOGY

## 2022-07-01 PROCEDURE — 271N000002 HC RX 271: Performed by: THORACIC SURGERY (CARDIOTHORACIC VASCULAR SURGERY)

## 2022-07-01 PROCEDURE — 272N000001 HC OR GENERAL SUPPLY STERILE: Performed by: THORACIC SURGERY (CARDIOTHORACIC VASCULAR SURGERY)

## 2022-07-01 PROCEDURE — 250N000009 HC RX 250: Performed by: ANESTHESIOLOGY

## 2022-07-01 PROCEDURE — 36415 COLL VENOUS BLD VENIPUNCTURE: CPT | Performed by: PHYSICIAN ASSISTANT

## 2022-07-01 PROCEDURE — 278N000051 HC OR IMPLANT GENERAL: Performed by: THORACIC SURGERY (CARDIOTHORACIC VASCULAR SURGERY)

## 2022-07-01 PROCEDURE — 250N000009 HC RX 250: Performed by: SURGERY

## 2022-07-01 PROCEDURE — 82310 ASSAY OF CALCIUM: CPT | Performed by: THORACIC SURGERY (CARDIOTHORACIC VASCULAR SURGERY)

## 2022-07-01 PROCEDURE — 258N000003 HC RX IP 258 OP 636: Performed by: SURGERY

## 2022-07-01 PROCEDURE — 250N000011 HC RX IP 250 OP 636: Performed by: PHYSICIAN ASSISTANT

## 2022-07-01 PROCEDURE — 258N000003 HC RX IP 258 OP 636: Performed by: ANESTHESIOLOGY

## 2022-07-01 PROCEDURE — 85610 PROTHROMBIN TIME: CPT | Performed by: THORACIC SURGERY (CARDIOTHORACIC VASCULAR SURGERY)

## 2022-07-01 PROCEDURE — 250N000013 HC RX MED GY IP 250 OP 250 PS 637: Performed by: PHYSICIAN ASSISTANT

## 2022-07-01 PROCEDURE — 250N000009 HC RX 250: Performed by: THORACIC SURGERY (CARDIOTHORACIC VASCULAR SURGERY)

## 2022-07-01 PROCEDURE — 85049 AUTOMATED PLATELET COUNT: CPT | Performed by: PHYSICIAN ASSISTANT

## 2022-07-01 PROCEDURE — 250N000025 HC SEVOFLURANE, PER MIN: Performed by: THORACIC SURGERY (CARDIOTHORACIC VASCULAR SURGERY)

## 2022-07-01 PROCEDURE — 370N000017 HC ANESTHESIA TECHNICAL FEE, PER MIN: Performed by: THORACIC SURGERY (CARDIOTHORACIC VASCULAR SURGERY)

## 2022-07-01 PROCEDURE — 94660 CPAP INITIATION&MGMT: CPT

## 2022-07-01 PROCEDURE — 360N000077 HC SURGERY LEVEL 4, PER MIN: Performed by: THORACIC SURGERY (CARDIOTHORACIC VASCULAR SURGERY)

## 2022-07-01 DEVICE — PROGEL,  PLEURAL AIR LEAK SEALANT, 4 ML KIT
Type: IMPLANTABLE DEVICE | Site: LUNG | Status: FUNCTIONAL
Brand: PROGEL

## 2022-07-01 RX ORDER — CEFAZOLIN SODIUM/WATER 2 G/20 ML
2 SYRINGE (ML) INTRAVENOUS
Status: COMPLETED | OUTPATIENT
Start: 2022-07-01 | End: 2022-07-01

## 2022-07-01 RX ORDER — HEPARIN SODIUM 5000 [USP'U]/.5ML
5000 INJECTION, SOLUTION INTRAVENOUS; SUBCUTANEOUS EVERY 8 HOURS
Status: DISCONTINUED | OUTPATIENT
Start: 2022-07-02 | End: 2022-07-04 | Stop reason: HOSPADM

## 2022-07-01 RX ORDER — NEOSTIGMINE METHYLSULFATE 1 MG/ML
VIAL (ML) INJECTION PRN
Status: DISCONTINUED | OUTPATIENT
Start: 2022-07-01 | End: 2022-07-01

## 2022-07-01 RX ORDER — FENTANYL CITRATE 50 UG/ML
25 INJECTION, SOLUTION INTRAMUSCULAR; INTRAVENOUS EVERY 5 MIN PRN
Status: DISCONTINUED | OUTPATIENT
Start: 2022-07-01 | End: 2022-07-01 | Stop reason: HOSPADM

## 2022-07-01 RX ORDER — CALCIUM CARBONATE 500 MG/1
500 TABLET, CHEWABLE ORAL 4 TIMES DAILY PRN
Status: DISCONTINUED | OUTPATIENT
Start: 2022-07-01 | End: 2022-07-04 | Stop reason: HOSPADM

## 2022-07-01 RX ORDER — BUPIVACAINE HYDROCHLORIDE 5 MG/ML
INJECTION, SOLUTION EPIDURAL; INTRACAUDAL
Status: DISCONTINUED
Start: 2022-07-01 | End: 2022-07-01 | Stop reason: HOSPADM

## 2022-07-01 RX ORDER — ACETAMINOPHEN 325 MG/1
975 TABLET ORAL EVERY 8 HOURS
Status: DISCONTINUED | OUTPATIENT
Start: 2022-07-01 | End: 2022-07-02

## 2022-07-01 RX ORDER — NALOXONE HYDROCHLORIDE 0.4 MG/ML
0.2 INJECTION, SOLUTION INTRAMUSCULAR; INTRAVENOUS; SUBCUTANEOUS
Status: DISCONTINUED | OUTPATIENT
Start: 2022-07-01 | End: 2022-07-04 | Stop reason: HOSPADM

## 2022-07-01 RX ORDER — CEFAZOLIN SODIUM/WATER 2 G/20 ML
2 SYRINGE (ML) INTRAVENOUS SEE ADMIN INSTRUCTIONS
Status: DISCONTINUED | OUTPATIENT
Start: 2022-07-01 | End: 2022-07-01 | Stop reason: HOSPADM

## 2022-07-01 RX ORDER — NALOXONE HYDROCHLORIDE 0.4 MG/ML
0.4 INJECTION, SOLUTION INTRAMUSCULAR; INTRAVENOUS; SUBCUTANEOUS
Status: DISCONTINUED | OUTPATIENT
Start: 2022-07-01 | End: 2022-07-04 | Stop reason: HOSPADM

## 2022-07-01 RX ORDER — HYDROMORPHONE HCL IN WATER/PF 6 MG/30 ML
0.4 PATIENT CONTROLLED ANALGESIA SYRINGE INTRAVENOUS EVERY 5 MIN PRN
Status: DISCONTINUED | OUTPATIENT
Start: 2022-07-01 | End: 2022-07-01 | Stop reason: HOSPADM

## 2022-07-01 RX ORDER — HYDROMORPHONE HCL IN WATER/PF 6 MG/30 ML
0.2 PATIENT CONTROLLED ANALGESIA SYRINGE INTRAVENOUS
Status: DISCONTINUED | OUTPATIENT
Start: 2022-07-01 | End: 2022-07-02

## 2022-07-01 RX ORDER — ONDANSETRON 2 MG/ML
4 INJECTION INTRAMUSCULAR; INTRAVENOUS EVERY 6 HOURS PRN
Status: DISCONTINUED | OUTPATIENT
Start: 2022-07-01 | End: 2022-07-04 | Stop reason: HOSPADM

## 2022-07-01 RX ORDER — ONDANSETRON 4 MG/1
4 TABLET, ORALLY DISINTEGRATING ORAL EVERY 6 HOURS PRN
Status: DISCONTINUED | OUTPATIENT
Start: 2022-07-01 | End: 2022-07-04 | Stop reason: HOSPADM

## 2022-07-01 RX ORDER — BUPIVACAINE HYDROCHLORIDE 5 MG/ML
INJECTION, SOLUTION PERINEURAL PRN
Status: DISCONTINUED | OUTPATIENT
Start: 2022-07-01 | End: 2022-07-01 | Stop reason: HOSPADM

## 2022-07-01 RX ORDER — HYDROMORPHONE HYDROCHLORIDE 2 MG/1
2 TABLET ORAL
Status: DISCONTINUED | OUTPATIENT
Start: 2022-07-01 | End: 2022-07-02

## 2022-07-01 RX ORDER — SODIUM CHLORIDE, SODIUM LACTATE, POTASSIUM CHLORIDE, CALCIUM CHLORIDE 600; 310; 30; 20 MG/100ML; MG/100ML; MG/100ML; MG/100ML
INJECTION, SOLUTION INTRAVENOUS CONTINUOUS
Status: DISCONTINUED | OUTPATIENT
Start: 2022-07-01 | End: 2022-07-01 | Stop reason: HOSPADM

## 2022-07-01 RX ORDER — HYDROCHLOROTHIAZIDE 12.5 MG/1
12.5 CAPSULE ORAL DAILY
Status: DISCONTINUED | OUTPATIENT
Start: 2022-07-02 | End: 2022-07-04 | Stop reason: HOSPADM

## 2022-07-01 RX ORDER — LIDOCAINE 40 MG/G
CREAM TOPICAL
Status: DISCONTINUED | OUTPATIENT
Start: 2022-07-01 | End: 2022-07-02

## 2022-07-01 RX ORDER — ONDANSETRON 2 MG/ML
4 INJECTION INTRAMUSCULAR; INTRAVENOUS EVERY 30 MIN PRN
Status: DISCONTINUED | OUTPATIENT
Start: 2022-07-01 | End: 2022-07-01 | Stop reason: HOSPADM

## 2022-07-01 RX ORDER — DEXAMETHASONE SODIUM PHOSPHATE 4 MG/ML
INJECTION, SOLUTION INTRA-ARTICULAR; INTRALESIONAL; INTRAMUSCULAR; INTRAVENOUS; SOFT TISSUE PRN
Status: DISCONTINUED | OUTPATIENT
Start: 2022-07-01 | End: 2022-07-01

## 2022-07-01 RX ORDER — AMOXICILLIN 250 MG
1 CAPSULE ORAL 2 TIMES DAILY
Status: DISCONTINUED | OUTPATIENT
Start: 2022-07-01 | End: 2022-07-04 | Stop reason: HOSPADM

## 2022-07-01 RX ORDER — LIDOCAINE 40 MG/G
CREAM TOPICAL
Status: DISCONTINUED | OUTPATIENT
Start: 2022-07-01 | End: 2022-07-04 | Stop reason: HOSPADM

## 2022-07-01 RX ORDER — MAGNESIUM HYDROXIDE 1200 MG/15ML
LIQUID ORAL PRN
Status: DISCONTINUED | OUTPATIENT
Start: 2022-07-01 | End: 2022-07-01 | Stop reason: HOSPADM

## 2022-07-01 RX ORDER — PROCHLORPERAZINE MALEATE 5 MG
5 TABLET ORAL EVERY 6 HOURS PRN
Status: DISCONTINUED | OUTPATIENT
Start: 2022-07-01 | End: 2022-07-04 | Stop reason: HOSPADM

## 2022-07-01 RX ORDER — ATORVASTATIN CALCIUM 10 MG/1
10 TABLET, FILM COATED ORAL DAILY
Status: DISCONTINUED | OUTPATIENT
Start: 2022-07-02 | End: 2022-07-04 | Stop reason: HOSPADM

## 2022-07-01 RX ORDER — DIPHENHYDRAMINE HYDROCHLORIDE 50 MG/ML
12.5 INJECTION INTRAMUSCULAR; INTRAVENOUS EVERY 6 HOURS PRN
Status: DISCONTINUED | OUTPATIENT
Start: 2022-07-01 | End: 2022-07-04 | Stop reason: HOSPADM

## 2022-07-01 RX ORDER — HYDROMORPHONE HYDROCHLORIDE 2 MG/1
4 TABLET ORAL
Status: DISCONTINUED | OUTPATIENT
Start: 2022-07-01 | End: 2022-07-02

## 2022-07-01 RX ORDER — DIPHENHYDRAMINE HCL 12.5MG/5ML
12.5 LIQUID (ML) ORAL EVERY 6 HOURS PRN
Status: DISCONTINUED | OUTPATIENT
Start: 2022-07-01 | End: 2022-07-04 | Stop reason: HOSPADM

## 2022-07-01 RX ORDER — OXYCODONE HYDROCHLORIDE 5 MG/1
5 TABLET ORAL EVERY 4 HOURS PRN
Status: DISCONTINUED | OUTPATIENT
Start: 2022-07-01 | End: 2022-07-01 | Stop reason: HOSPADM

## 2022-07-01 RX ORDER — FENTANYL CITRATE 50 UG/ML
INJECTION, SOLUTION INTRAMUSCULAR; INTRAVENOUS PRN
Status: DISCONTINUED | OUTPATIENT
Start: 2022-07-01 | End: 2022-07-01

## 2022-07-01 RX ORDER — NITROGLYCERIN 0.4 MG/1
0.4 TABLET SUBLINGUAL EVERY 5 MIN PRN
Status: DISCONTINUED | OUTPATIENT
Start: 2022-07-01 | End: 2022-07-04 | Stop reason: HOSPADM

## 2022-07-01 RX ORDER — ONDANSETRON 2 MG/ML
INJECTION INTRAMUSCULAR; INTRAVENOUS PRN
Status: DISCONTINUED | OUTPATIENT
Start: 2022-07-01 | End: 2022-07-01

## 2022-07-01 RX ORDER — HYDROMORPHONE HCL IN WATER/PF 6 MG/30 ML
0.4 PATIENT CONTROLLED ANALGESIA SYRINGE INTRAVENOUS
Status: DISCONTINUED | OUTPATIENT
Start: 2022-07-01 | End: 2022-07-02

## 2022-07-01 RX ORDER — ONDANSETRON 4 MG/1
4 TABLET, ORALLY DISINTEGRATING ORAL EVERY 30 MIN PRN
Status: DISCONTINUED | OUTPATIENT
Start: 2022-07-01 | End: 2022-07-01 | Stop reason: HOSPADM

## 2022-07-01 RX ORDER — POLYETHYLENE GLYCOL 3350 17 G/17G
17 POWDER, FOR SOLUTION ORAL DAILY
Status: DISCONTINUED | OUTPATIENT
Start: 2022-07-02 | End: 2022-07-04 | Stop reason: HOSPADM

## 2022-07-01 RX ORDER — GINSENG 100 MG
CAPSULE ORAL
Status: DISCONTINUED | OUTPATIENT
Start: 2022-07-01 | End: 2022-07-04 | Stop reason: HOSPADM

## 2022-07-01 RX ORDER — LIDOCAINE HYDROCHLORIDE 20 MG/ML
INJECTION, SOLUTION INFILTRATION; PERINEURAL PRN
Status: DISCONTINUED | OUTPATIENT
Start: 2022-07-01 | End: 2022-07-01

## 2022-07-01 RX ORDER — ACETAMINOPHEN 325 MG/1
650 TABLET ORAL EVERY 4 HOURS PRN
Status: DISCONTINUED | OUTPATIENT
Start: 2022-07-04 | End: 2022-07-02

## 2022-07-01 RX ORDER — SODIUM CHLORIDE 9 MG/ML
INJECTION, SOLUTION INTRAVENOUS CONTINUOUS
Status: DISCONTINUED | OUTPATIENT
Start: 2022-07-01 | End: 2022-07-04 | Stop reason: HOSPADM

## 2022-07-01 RX ORDER — FAMOTIDINE 20 MG/1
20 TABLET, FILM COATED ORAL 2 TIMES DAILY
Status: DISCONTINUED | OUTPATIENT
Start: 2022-07-01 | End: 2022-07-04 | Stop reason: HOSPADM

## 2022-07-01 RX ORDER — GLYCOPYRROLATE 0.2 MG/ML
INJECTION, SOLUTION INTRAMUSCULAR; INTRAVENOUS PRN
Status: DISCONTINUED | OUTPATIENT
Start: 2022-07-01 | End: 2022-07-01

## 2022-07-01 RX ORDER — LIDOCAINE HYDROCHLORIDE 20 MG/ML
INJECTION, SOLUTION INFILTRATION; PERINEURAL
Status: DISCONTINUED
Start: 2022-07-01 | End: 2022-07-01 | Stop reason: HOSPADM

## 2022-07-01 RX ORDER — DEXAMETHASONE SODIUM PHOSPHATE 4 MG/ML
INJECTION, SOLUTION INTRA-ARTICULAR; INTRALESIONAL; INTRAMUSCULAR; INTRAVENOUS; SOFT TISSUE
Status: DISCONTINUED
Start: 2022-07-01 | End: 2022-07-01 | Stop reason: HOSPADM

## 2022-07-01 RX ORDER — AMLODIPINE BESYLATE 10 MG/1
10 TABLET ORAL DAILY
Status: DISCONTINUED | OUTPATIENT
Start: 2022-07-02 | End: 2022-07-04 | Stop reason: HOSPADM

## 2022-07-01 RX ADMIN — HYDROMORPHONE HYDROCHLORIDE 0.4 MG: 0.2 INJECTION, SOLUTION INTRAMUSCULAR; INTRAVENOUS; SUBCUTANEOUS at 12:57

## 2022-07-01 RX ADMIN — ROCURONIUM BROMIDE 10 MG: 50 INJECTION, SOLUTION INTRAVENOUS at 11:20

## 2022-07-01 RX ADMIN — Medication 2 G: at 09:30

## 2022-07-01 RX ADMIN — DEXMEDETOMIDINE HYDROCHLORIDE 8 MCG: 100 INJECTION, SOLUTION INTRAVENOUS at 09:32

## 2022-07-01 RX ADMIN — PHENYLEPHRINE HYDROCHLORIDE 0.3 MCG/KG/MIN: 10 INJECTION INTRAVENOUS at 10:16

## 2022-07-01 RX ADMIN — ROCURONIUM BROMIDE 10 MG: 50 INJECTION, SOLUTION INTRAVENOUS at 11:01

## 2022-07-01 RX ADMIN — HYDROMORPHONE HYDROCHLORIDE 0.4 MG: 0.2 INJECTION, SOLUTION INTRAMUSCULAR; INTRAVENOUS; SUBCUTANEOUS at 13:32

## 2022-07-01 RX ADMIN — HYDROMORPHONE HYDROCHLORIDE 0.2 MG: 0.2 INJECTION, SOLUTION INTRAMUSCULAR; INTRAVENOUS; SUBCUTANEOUS at 23:53

## 2022-07-01 RX ADMIN — SODIUM CHLORIDE, POTASSIUM CHLORIDE, SODIUM LACTATE AND CALCIUM CHLORIDE: 600; 310; 30; 20 INJECTION, SOLUTION INTRAVENOUS at 09:30

## 2022-07-01 RX ADMIN — FENTANYL CITRATE 50 MCG: 50 INJECTION, SOLUTION INTRAMUSCULAR; INTRAVENOUS at 09:35

## 2022-07-01 RX ADMIN — SODIUM CHLORIDE, POTASSIUM CHLORIDE, SODIUM LACTATE AND CALCIUM CHLORIDE: 600; 310; 30; 20 INJECTION, SOLUTION INTRAVENOUS at 11:22

## 2022-07-01 RX ADMIN — SODIUM CHLORIDE: 9 INJECTION, SOLUTION INTRAVENOUS at 15:50

## 2022-07-01 RX ADMIN — FAMOTIDINE 20 MG: 10 INJECTION INTRAVENOUS at 20:16

## 2022-07-01 RX ADMIN — DEXMEDETOMIDINE HYDROCHLORIDE 8 MCG: 100 INJECTION, SOLUTION INTRAVENOUS at 09:35

## 2022-07-01 RX ADMIN — Medication 550 ML: at 12:49

## 2022-07-01 RX ADMIN — HYDROMORPHONE HYDROCHLORIDE 0.5 MG: 1 INJECTION, SOLUTION INTRAMUSCULAR; INTRAVENOUS; SUBCUTANEOUS at 12:26

## 2022-07-01 RX ADMIN — FENTANYL CITRATE 50 MCG: 50 INJECTION, SOLUTION INTRAMUSCULAR; INTRAVENOUS at 09:32

## 2022-07-01 RX ADMIN — ROCURONIUM BROMIDE 20 MG: 50 INJECTION, SOLUTION INTRAVENOUS at 10:21

## 2022-07-01 RX ADMIN — NEOSTIGMINE METHYLSULFATE 5 MG: 1 INJECTION, SOLUTION INTRAVENOUS at 12:09

## 2022-07-01 RX ADMIN — HYDROMORPHONE HYDROCHLORIDE 0.2 MG: 0.2 INJECTION, SOLUTION INTRAMUSCULAR; INTRAVENOUS; SUBCUTANEOUS at 20:15

## 2022-07-01 RX ADMIN — PHENYLEPHRINE HYDROCHLORIDE 100 MCG: 10 INJECTION INTRAVENOUS at 10:08

## 2022-07-01 RX ADMIN — HYDROMORPHONE HYDROCHLORIDE 0.4 MG: 0.2 INJECTION, SOLUTION INTRAMUSCULAR; INTRAVENOUS; SUBCUTANEOUS at 13:09

## 2022-07-01 RX ADMIN — ACETAMINOPHEN 975 MG: 325 TABLET ORAL at 23:53

## 2022-07-01 RX ADMIN — GLYCOPYRROLATE 1 MG: 0.2 INJECTION, SOLUTION INTRAMUSCULAR; INTRAVENOUS at 12:09

## 2022-07-01 RX ADMIN — LIDOCAINE HYDROCHLORIDE 100 MG: 20 INJECTION, SOLUTION INFILTRATION; PERINEURAL at 09:35

## 2022-07-01 RX ADMIN — FENTANYL CITRATE 25 MCG: 50 INJECTION, SOLUTION INTRAMUSCULAR; INTRAVENOUS at 12:49

## 2022-07-01 RX ADMIN — ROCURONIUM BROMIDE 50 MG: 50 INJECTION, SOLUTION INTRAVENOUS at 09:35

## 2022-07-01 RX ADMIN — ONDANSETRON 4 MG: 2 INJECTION INTRAMUSCULAR; INTRAVENOUS at 11:46

## 2022-07-01 RX ADMIN — SENNOSIDES AND DOCUSATE SODIUM 1 TABLET: 50; 8.6 TABLET ORAL at 20:15

## 2022-07-01 RX ADMIN — DEXAMETHASONE SODIUM PHOSPHATE 4 MG: 4 INJECTION, SOLUTION INTRA-ARTICULAR; INTRALESIONAL; INTRAMUSCULAR; INTRAVENOUS; SOFT TISSUE at 09:48

## 2022-07-01 RX ADMIN — HYDROMORPHONE HYDROCHLORIDE 0.4 MG: 0.2 INJECTION, SOLUTION INTRAMUSCULAR; INTRAVENOUS; SUBCUTANEOUS at 15:58

## 2022-07-01 RX ADMIN — FENTANYL CITRATE 25 MCG: 50 INJECTION, SOLUTION INTRAMUSCULAR; INTRAVENOUS at 12:46

## 2022-07-01 RX ADMIN — PHENYLEPHRINE HYDROCHLORIDE 100 MCG: 10 INJECTION INTRAVENOUS at 10:15

## 2022-07-01 ASSESSMENT — ACTIVITIES OF DAILY LIVING (ADL)
ADLS_ACUITY_SCORE: 18
ADLS_ACUITY_SCORE: 18
ADLS_ACUITY_SCORE: 20
ADLS_ACUITY_SCORE: 18
WEAR_GLASSES_OR_BLIND: NO
ADLS_ACUITY_SCORE: 18

## 2022-07-01 ASSESSMENT — ENCOUNTER SYMPTOMS
ORTHOPNEA: 0
DYSRHYTHMIAS: 0

## 2022-07-01 NOTE — ANESTHESIA PREPROCEDURE EVALUATION
Anesthesia Pre-Procedure Evaluation    Patient: Carlos Kingston   MRN: [id number] : 1955        Procedure : Procedure(s):  RIGHT THORACOTOMY  RIGHT MIDDLE LOBECTOMY          Past Medical History:   Diagnosis Date     Acute kidney failure, unspecified (H)      Anterior subcapsular polar age-related cataract of left eye      Arthritis     Bilateral knees     Cataract      Daytime somnolence      History of acute bronchitis      History of dermatophytosis      History of eye trauma      History of prostatitis      Hyperlipidemia      Hypertension      Knee joint replacement status      Male erectile disorder      Methicillin resistant Staphylococcus aureus culture positive      Migraines      Osteoarthrosis      Other chronic pain     Migraine headaches     Pneumonia of right middle lobe due to infectious organism      S/P cervical spinal fusion      Sleep apnea      Snoring      Tubular adenoma of colon      Vitamin D deficiency disease       Past Surgical History:   Procedure Laterality Date     ANKLE SURGERY Right     Debridement     ARTHROPLASTY HIP Right 2014    Procedure: ARTHROPLASTY HIP;  Surgeon: Elliott Gonzalez MD;  Location: [facility] OR     ARTHROPLASTY KNEE BILATERAL Bilateral 2019    Procedure: 1.  Left total knee arthroplasty.  2.  Right total knee arthroplasty.;  Surgeon: Elliott Gonzalez MD;  Location: [facility] OR     ARTHROSCOPY KNEE Right      ARTHROSCOPY KNEE WITH MEDIAL MENISCECTOMY  2013    Procedure: ARTHROSCOPY KNEE WITH MEDIAL MENISCECTOMY;  RIGHT ARTHROSCOPY KNEE WITH MEDIAL MENISCECTOMY;  Surgeon: Elliott Gonzalez MD;  Location: [facility] OR     CARPAL TUNNEL RELEASE RT/LT Bilateral      COLONOSCOPY       DISCECTOMY, FUSION CERVICAL ANTERIOR TWO LEVELS, COMBINED N/A 2014    Procedure: COMBINED DISCECTOMY, FUSION CERVICAL ANTERIOR TWO LEVELS;  Surgeon: Mainor Adan MD;  Location: [facility] OR     IMPLANT PROSTHESIS PENIS INFLATABLE N/A 2020    Procedure: PLACEMENT OF COLOPLAST  PENILE PROSTHESIS;  Surgeon: Yaron Shetty MD;  Location: [facility] OR     ORTHOPEDIC SURGERY      right ankle arthroscopy     ORTHOPEDIC SURGERY      left hip replacment     ORTHOPEDIC SURGERY      bilateral carpal tunnel     SOFT TISSUE SURGERY      fatty lump removed rt. arm     VASECTOMY       ZZC TOTAL HIP ARTHROPLASTY Left       No Known Allergies   Social History     Tobacco Use     Smoking status: Never Smoker     Smokeless tobacco: Never Used   Substance Use Topics     Alcohol use: Yes     Comment: 3-4 drinks/week      Wt Readings from Last 1 Encounters:   07/01/22 95.7 kg (211 lb)        Anesthesia Evaluation            ROS/MED HX  ENT/Pulmonary: Comment: Chronic cough    (+) sleep apnea, uses CPAP,     Neurologic: Comment: S/p cervical fusion    (+) migraines,     Cardiovascular:     (+) Dyslipidemia hypertension----- (-) [place], orthopnea/PND and arrhythmias   METS/Exercise Tolerance: 3 - Able to walk 1-2 blocks without stopping    Hematologic:       Musculoskeletal:   (+) arthritis,     GI/Hepatic:  - neg GI/hepatic ROS     Renal/Genitourinary:     (+) renal disease,     Endo:  - neg endo ROS     Psychiatric/Substance Use:       Infectious Disease:       Malignancy:       Other:      (+) , H/O Chronic Pain,        Physical Exam    Airway        Mallampati: II   TM distance: > 3 FB   Neck ROM: full   Mouth opening: > 3 cm    Respiratory Devices and Support         Dental  no notable dental history         Cardiovascular   cardiovascular exam normal          Pulmonary   pulmonary exam normal                OUTSIDE LABS:  CBC:   Lab Results   Component Value Date    WBC 7.4 07/01/2022    WBC 9.5 06/27/2022    HGB 14.2 07/01/2022    HGB 14.6 06/27/2022    HCT 42.6 07/01/2022    HCT 43.1 06/27/2022     07/01/2022     06/27/2022     BMP:   Lab Results   Component Value Date     07/01/2022     06/27/2022    POTASSIUM 4.0 07/01/2022    POTASSIUM 3.8 06/27/2022    CHLORIDE 111 (H)  07/01/2022    CHLORIDE 108 06/27/2022    CO2 24 07/01/2022    CO2 26 06/27/2022    BUN 20 07/01/2022    BUN 20 06/27/2022    CR 1.11 07/01/2022    CR 1.16 06/27/2022     (H) 07/01/2022     (H) 06/27/2022     COAGS:   Lab Results   Component Value Date    INR 0.99 07/01/2022     POC:   Lab Results   Component Value Date     (H) 08/04/2020     HEPATIC:   Lab Results   Component Value Date    ALBUMIN 4.2 08/18/2016    PROTTOTAL 6.7 (L) 08/18/2016    ALT 44 08/18/2016    AST 25 08/18/2016    ALKPHOS 63 08/18/2016    BILITOTAL 0.8 08/18/2016     OTHER:   Lab Results   Component Value Date    VADIM 8.6 07/01/2022    CRP <5.0 03/29/2012    SED 4 03/29/2012       Anesthesia Plan    ASA Status:  3   NPO Status:  NPO Appropriate    Anesthesia Type: General.     - Airway: ETT   Induction: Intravenous, Propofol.   Maintenance: Balanced.   Techniques and Equipment:     - Airway: Double lumen ETT, Fiberoptic Bronchoscope (35F L DELILAH)         Consents    Anesthesia Plan(s) and associated risks, benefits, and realistic alternatives discussed. Questions answered and patient/representative(s) expressed understanding.    - Discussed: Risks, Benefits and Alternatives for the PROCEDURE were discussed     - Discussed with:          Postoperative Care    Pain management: IV analgesics, Multi-modal analgesia.   PONV prophylaxis: Ondansetron (or other 5HT-3), Dexamethasone or Solumedrol, Background Propofol Infusion     Comments:           H&P reviewed: Unable to attach H&P to encounter due to EHR limitations. H&P Update: appropriate H&P reviewed, patient examined. No interval changes since H&P (within 30 days).         Gavin Currie MD

## 2022-07-01 NOTE — ANESTHESIA CARE TRANSFER NOTE
Patient: Carlos Kingston    Procedure: Procedure(s):  RIGHT THORACOTOMY  RIGHT MIDDLE LOBECTOMY       Diagnosis: Collapse of right lung [J98.11]  Diagnosis Additional Information: No value filed.    Anesthesia Type:   General     Note:    Oropharynx: oropharynx clear of all foreign objects and spontaneously breathing  Level of Consciousness: awake  Oxygen Supplementation: face mask  Level of Supplemental Oxygen (L/min / FiO2): 8  Independent Airway: airway patency satisfactory and stable  Dentition: dentition unchanged  Vital Signs Stable: post-procedure vital signs reviewed and stable  Report to RN Given: handoff report given  Patient transferred to: PACU  Comments: Neuromuscular blockade reversed after TOF 4/4, spontaneous respirations, adequate tidal volumes, followed commands to voice, oropharynx suctioned with soft flexible catheter, extubated atraumatically, extubated with suction, airway patent after extubation.  Oxygen via facemask at 8 liters per minute to PACU. Oxygen tubing connected to wall O2 in PACU, SpO2, NiBP, and EKG monitors and alarms on and functioning, Peterson Hugger warmer connected to patient gown, report on patient's clinical status given to PACU RN, RN questions answered.     Handoff Report: Identifed the Patient, Identified the Reponsible Provider, Reviewed the pertinent medical history, Discussed the surgical course, Reviewed Intra-OP anesthesia mangement and issues during anesthesia, Set expectations for post-procedure period and Allowed opportunity for questions and acknowledgement of understanding      Vitals:  Vitals Value Taken Time   BP 93/57 07/01/22 1237   Temp     Pulse 63 07/01/22 1241   Resp 23 07/01/22 1241   SpO2 92 % 07/01/22 1241   Vitals shown include unvalidated device data.    Electronically Signed By: Radha Merritt  July 1, 2022  12:42 PM

## 2022-07-01 NOTE — ANESTHESIA POSTPROCEDURE EVALUATION
Patient: Carlos Kingston    Procedure: Procedure(s):  RIGHT THORACOTOMY  RIGHT MIDDLE LOBECTOMY       Anesthesia Type:  General    Note:  Disposition: Inpatient   Postop Pain Control: Uneventful            Sign Out: Well controlled pain   PONV:    Neuro/Psych: Uneventful            Sign Out: Acceptable/Baseline neuro status   Airway/Respiratory: Uneventful            Sign Out: O2 supplementation               Oxygen: BiPAP.   CV/Hemodynamics: Uneventful            Sign Out: Acceptable CV status   Other NRE: NONE   DID A NON-ROUTINE EVENT OCCUR? No    Event details/Postop Comments:  Patient awake. Requiring biPAP but will saturate to 96% on 30 % FiO2 with coaching. Otherwise can drift to 90-92%.  CXR without PTX. Basilar atalectasis.              Last vitals:  Vitals Value Taken Time   /69 07/01/22 1510   Temp 36.1  C (97  F) 07/01/22 1450   Pulse 65 07/01/22 1512   Resp 67 07/01/22 1512   SpO2 93 % 07/01/22 1513   Vitals shown include unvalidated device data.    Electronically Signed By: Ronald Meza MD  July 1, 2022  3:52 PM

## 2022-07-01 NOTE — OP NOTE
Procedure Date: 07/01/2022    SURGEON:  Steven Reveles MD    ASSISTANT:  Julieta Thompson PA-C    SECOND ASSISTANT: Sabina Escobar PA-C    PREOPERATIVE DIAGNOSIS:  History of foreign body, right middle lobe bronchus with chronic complete atelectasis, right middle lobe lung.    POSTOPERATIVE DIAGNOSIS:  History of foreign body, right middle lobe bronchus with chronic complete atelectasis, right middle lobe lung.    PROCEDURE:  Right thoracotomy and right middle lobectomy.    ANESTHESIA:  General with double-lumen endotracheal tube.    INDICATIONS FOR PROCEDURE:  A 67-year-old gentleman with a history of a foreign body in the right middle bronchus.  Bronchoscopy was done in 08/2020 and the foreign body was removed.  Despite this, he has been dealing with severe, chronic cough.  Repeat bronchoscopy on 04/27/2022 noted that the exam was unremarkable.  CT scan dated 04/18/2022 showed complete collapse or atelectasis of the right middle lobe lung.  Findings are consistent with recurrent chronic atelectasis of the right middle lobe lung from a chronic prior obstruction of the middle bronchus from a foreign body.  Because of the severity of his symptoms, a middle lobectomy was performed.    DESCRIPTION OF PROCEDURE:  The patient was brought and placed in supine and positioned under general anesthesia with double-lumen endotracheal tube.  Flexible bronchoscope was used to place this double-lumen tube.  On examination, there was some secretion on the right side.  The bronchial anatomy appeared normal.  Then patient was placed in left lateral decubitus position.  The right chest was prepared and draped in sterile fashion with ChloraPrep and ventilation of the right lung was discontinued.  A right posterolateral thoracotomy was made, sparing the serratus anterior muscle.  Pleural space and fifth intercostal space was preserving the integrity of the rib.  On examination, there was no pleural fluid or pleural nodule.   Palpation of the upper and lower lobe are normal.  The middle lobe was very small and completely atelectatic.  There were some filmy adhesions to the mediastinum, which were taken down.  Then, the hilum was dissected anteriorly.  The pulmonary artery was exposed in the fissure.  The anterior aspect of the major fissure was divided with application of Wat[place]a 35 vascular stapling device.  There was a lymph node along the middle bronchus, which was peeled away.  Then, the branch of the superior pulmonary vein draining the middle lobe was dissected circumferentially with staple application of Wat[place] 35 vascular stapling device.  Then, the right middle bronchus was dissected circumferentially at its origin and staple application of Wat[place]a 35 vascular stapling device.  Then, the dissection was carried down in the fissure.  There was significant inflammation.  The 2 branches of the pulmonary artery to the middle lobe was identified and carefully dissected.  The distal branch was dissected circumferentially with staple application of [place] 35 vascular stapling device.  Then, the proximal branch was dissected circumferentially and stapled in similar fashion.  Then, the middle lobectomy was completed, dividing the minor fissure with 2 applications of Wat[place]a 60 gold stapling device.  Progel was placed on the fissure.  Hemostasis was excellent.  There was an excellent reexpansion of the upper and lower lobe.  Bronchial stump was airtight at a pressure of 20 cm of water.  Through a separate stab wound, a 28 straight chest tube was placed with the tip directed apex posteriorly and sutured to the skin with 2-0 silk suture.  On-Q catheter was placed and 30 mL of Marcaine 0.5% without epinephrine was injected as costal blocks.  The incision was closed with pericostal suture Vicryl #1, running #1 Vicryl muscular layer, running 2-0 Vicryl for subcutaneous tissue and the skin closed with Insorb staples.    ESTIMATED BLOOD LOSS:   25 mL.    COUNTS:  Needle and sponge count is correct.    Julieta Thompson PA-C, was the first assistant during the procedure.  Her role as first assistant was essential and necessary in accomplishing the steps of the procedure as described above, providing exposure and retraction.    Steven Reveles MD        D: 2022   T: 2022   MT: [id number]    Name:     ANTONY PERRY  MRN:      [id number]        Account:        [id number]   :      1955           Procedure Date: 2022     Document: [id number]

## 2022-07-01 NOTE — DISCHARGE INSTRUCTIONS
"[facility]   Discharge instructions and Follow-Up Care for Dr. Reveles' Thoracoscopy and Thoracotomy patients:    You already have a post-op chest x-ray and post-op appointment scheduled as follows:  Go to [facility] ([address] [place] [address], [address], [place] [address]) at ______on____ for a post-op chest x-ray.  Then go upstairs to the [facility] Oncology office in [address] at ______ on the same day for your post-op appointment. You will see either Julieta Thompson PA-C, Sabina Escobar PA-C or Dr. Reveles for your post-op appointment.     You need to call to schedule your post-op chest x-ray and appointment: Rosaura [phone] FOR Monday 07/11/2022    Patient care:  Call Dr. Reveles' office [phone] if you experience:   *Severe chills or fever of 101 F or jennifer on two occasions   *Severely increased incisional pain that cannot be relieved with rest or pain medications   *Presence of unusual incisional or chest tube site drainage that is odorous, green or yellow in color, kandace bright red blood or if your incision is warm/red/swollen   *Coughing up bright red blood or greenish-yellow secretions   *Inability to urinate or have a bowel movement   *New pain or swelling in your legs    In an emergency, call [phone] or have someone drive you to an Emergency Department    Pain Relief:  You may take ibuprofen and acetaminophen according to the directions on the medication bottle. (Common commercial names for ibuprofen at Advil or Motrin). The common commercial name for acetaminophen is Tylenol.) You may also have been prescribed a narcotic pain medicine. Most people get good pain relief by \"staggering\" these medications.     No driving while on narcotic pain medicines.    Activity:  ___No activity restrictions  _XXXX__No lifting greater than 10 pounds with your operative side arm for the next 4 weeks    Wound Care:   *Please look at your incisions daily and keep them clean " while they heal   *Do not apply creams, salves such as Bacitracin, or ointments on the incisions while they heal   *Steri strips (thin white pieces of tape) will be present on the incision(s) and will peel off as your incision heals-- otherwise, they will be removed at your post-op appointment    You may then shower. Wash the incision and chest tube sites daily with soap and water. No bathing/immersing incisions under water for two weeks or until the chest tube sites are completely healed.   *After your shower, pat the chest tube sites dry and place a dry gauze dressing (and tape) over the sites. It is normal to have some drainage from the sites for a few days. Do not be alarmed if a large amount of fluid drains (should be pink or yellow) either spontaneously or with coughing or exertion. Should this happen, just place a larger, thicker gauze dressing over the chest tube sites. In about a week, drainage should stop and a scab will form. Once drainage stops, you can stop covering the sites. Call our office if the drainage is milky or green in color or foul-smelling.    Respiratory Care:  Utilize the incentive spirometer and flutter valve/acapella (if you received one) several times in a row every few hours while awake for a few weeks after discharging home from the hospital.    Activity:  It may take a few weeks to regain your normal energy level/stamina. It is important during your recovery to get regular physical activity such as walking each day, climbing stairs as tolerated, and avoiding prolonged daytime naps    Return to Work:  Time away from work will depend on your specific situation. In general, you will need between 1-6 weeks to fully recover from surgery. Specific dates for retuning to work can be discussed at your post-op appointment.

## 2022-07-01 NOTE — ANESTHESIA PROCEDURE NOTES
Airway       Patient location during procedure: OR       Procedure Start/Stop Times: 7/1/2022 9:37 AM  Staff -        Anesthesiologist:  Gavin Currie MD       CRNA: Radha Merritt       Performed By: CRNA  Consent for Airway        Urgency: elective  Indications and Patient Condition       Indications for airway management: gui-procedural       Induction type:intravenous       Mask difficulty assessment: 2 - vent by mask + OA or adjuvant +/- NMBA    Final Airway Details       Final airway type: endotracheal airway       Successful airway: ETT - double lumen left  Endotracheal Airway Details        Cuffed: yes       Successful intubation technique: video laryngoscopy       VL Blade Size: Blood 3       Grade View of Cords: 1       Adjucts: stylet       Position: Right       Measured from: lips       Bite block used: None       ETT Double lumen (fr): 35    Post intubation assessment        Placement verified by: capnometry, equal breath sounds and chest rise        Number of attempts at approach: 1       Number of other approaches attempted: 0       Secured with: pink tape       Ease of procedure: easy       Dentition: Intact and Unchanged    Medication(s) Administered   Medication Administration Time: 7/1/2022 9:37 AM

## 2022-07-01 NOTE — PROGRESS NOTES
Handoff report give to DANIELE Meza notified patient pox continued 86-92% on home Cpap. RT called to assess. Patient trial on Bipap. Results in Pox 98%. NAINA Meza OK with use of Bipap in PACU.

## 2022-07-01 NOTE — PROGRESS NOTES
NAINA Meza consulted. Patient reports pain is preventing him from taking a good deep breath. Pox 86-90% on 8L with home CPAP settings. Plan to give pain medications with RN to monitor and  breathing.     Updated: Pox has improved and patient is 89-93% as he is able to breathe more comfortably.

## 2022-07-01 NOTE — BRIEF OP NOTE
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  Post-operative diagnosis:  Same    Procedure: Procedure(s):  RIGHT THORACOTOMY  RIGHT MIDDLE LOBECTOMY  Surgeon: Surgeon(s) and Role:     * Steven Reveles MD - Primary     * Julieta Thompson PA-C - Assisting     * Keyona Escobar PA-C - Assisting  Anesthesia: General   Estimated Blood Loss: 25 cc  Drains:  One 28 Fr straight chest tube to apex of right lung    Specimens:   ID Type Source Tests Collected by Time Destination   1 : RIGHT MIDDLE LOBE LUNG Tissue Lung, Middle Lobe, Right SURGICAL PATHOLOGY EXAM Steven Reveles MD 7/1/2022 11:30 AM      Findings:   Inflamed and significantly collapsed middle lobe lung..  Complications: None.    Sabina Escobar PA-C with Dr. Steven Reveles  [facility] Thoracic Surgery  Office: [phone]  Cell: [phone]

## 2022-07-01 NOTE — PLAN OF CARE
Date: July 1, 2022  Shift: [id number]  Name: Carlos Kingston  Age: 67 year old  YOB: 1955    Reason for Admission: Collapse of right lung [J98.11]  S/P thoracotomy [Z98.890]     Cognitive/Mentation: A/Ox4 - drowsy with dilaudid  Neuros/CMS: WNL    VS: VSS  Cardiac: Tele - SR  GI: No BM, audilbe/active bowel sounds  : WNL  Pulmonary: 10L oxymask  Pain: pain controlled with prn medications     Drains: R sided chest tube to suction; PIV L hand NS @ 75 infusing; pain ball in medial/upper back  Skin: Chest tube insertion site covered, dressing CDI   Activity: Has not gotten out of bed yet     Diet: Clear liquids - advance as tolerated       Discharge: TBD     Shift summary: Thoracotomy and lobectomy done today.Patient arrived to floor from PACU around 1530.

## 2022-07-02 ENCOUNTER — APPOINTMENT (OUTPATIENT)
Dept: GENERAL RADIOLOGY | Facility: CLINIC | Age: 67
DRG: 165 | End: 2022-07-02
Attending: PHYSICIAN ASSISTANT
Payer: MEDICARE

## 2022-07-02 LAB
GLUCOSE BLDC GLUCOMTR-MCNC: 123 MG/DL (ref 70–99)
HGB BLD-MCNC: 12.8 G/DL (ref 13.3–17.7)

## 2022-07-02 PROCEDURE — 36415 COLL VENOUS BLD VENIPUNCTURE: CPT | Performed by: PHYSICIAN ASSISTANT

## 2022-07-02 PROCEDURE — 250N000013 HC RX MED GY IP 250 OP 250 PS 637: Performed by: PHYSICIAN ASSISTANT

## 2022-07-02 PROCEDURE — 250N000013 HC RX MED GY IP 250 OP 250 PS 637: Performed by: THORACIC SURGERY (CARDIOTHORACIC VASCULAR SURGERY)

## 2022-07-02 PROCEDURE — 85018 HEMOGLOBIN: CPT | Performed by: PHYSICIAN ASSISTANT

## 2022-07-02 PROCEDURE — 999N000065 XR CHEST PORT 1 VIEW

## 2022-07-02 PROCEDURE — 250N000011 HC RX IP 250 OP 636: Performed by: PHYSICIAN ASSISTANT

## 2022-07-02 PROCEDURE — 120N000001 HC R&B MED SURG/OB

## 2022-07-02 RX ORDER — ACETAMINOPHEN 500 MG
1000 TABLET ORAL
Status: DISCONTINUED | OUTPATIENT
Start: 2022-07-02 | End: 2022-07-04 | Stop reason: HOSPADM

## 2022-07-02 RX ORDER — HYDROMORPHONE HYDROCHLORIDE 2 MG/1
2-4 TABLET ORAL
Status: DISCONTINUED | OUTPATIENT
Start: 2022-07-02 | End: 2022-07-04 | Stop reason: HOSPADM

## 2022-07-02 RX ADMIN — AMLODIPINE BESYLATE 10 MG: 10 TABLET ORAL at 08:03

## 2022-07-02 RX ADMIN — ATORVASTATIN CALCIUM 10 MG: 10 TABLET, FILM COATED ORAL at 08:02

## 2022-07-02 RX ADMIN — ACETAMINOPHEN 1000 MG: 500 TABLET, FILM COATED ORAL at 13:58

## 2022-07-02 RX ADMIN — ACETAMINOPHEN 975 MG: 325 TABLET ORAL at 08:02

## 2022-07-02 RX ADMIN — SENNOSIDES AND DOCUSATE SODIUM 1 TABLET: 50; 8.6 TABLET ORAL at 08:02

## 2022-07-02 RX ADMIN — POLYETHYLENE GLYCOL 3350 17 G: 17 POWDER, FOR SOLUTION ORAL at 08:03

## 2022-07-02 RX ADMIN — HEPARIN SODIUM 5000 UNITS: 5000 INJECTION, SOLUTION INTRAVENOUS; SUBCUTANEOUS at 06:36

## 2022-07-02 RX ADMIN — SENNOSIDES AND DOCUSATE SODIUM 1 TABLET: 50; 8.6 TABLET ORAL at 20:41

## 2022-07-02 RX ADMIN — HEPARIN SODIUM 5000 UNITS: 5000 INJECTION, SOLUTION INTRAVENOUS; SUBCUTANEOUS at 23:48

## 2022-07-02 RX ADMIN — HYDROMORPHONE HYDROCHLORIDE 4 MG: 2 TABLET ORAL at 08:02

## 2022-07-02 RX ADMIN — ACETAMINOPHEN 1000 MG: 500 TABLET, FILM COATED ORAL at 23:48

## 2022-07-02 RX ADMIN — FAMOTIDINE 20 MG: 20 TABLET ORAL at 08:02

## 2022-07-02 RX ADMIN — HYDROMORPHONE HYDROCHLORIDE 4 MG: 2 TABLET ORAL at 23:49

## 2022-07-02 RX ADMIN — HYDROCHLOROTHIAZIDE 12.5 MG: 12.5 CAPSULE ORAL at 08:03

## 2022-07-02 RX ADMIN — FAMOTIDINE 20 MG: 20 TABLET ORAL at 20:41

## 2022-07-02 RX ADMIN — HYDROMORPHONE HYDROCHLORIDE 2 MG: 2 TABLET ORAL at 02:10

## 2022-07-02 RX ADMIN — HYDROMORPHONE HYDROCHLORIDE 4 MG: 2 TABLET ORAL at 05:04

## 2022-07-02 RX ADMIN — HYDROMORPHONE HYDROCHLORIDE 4 MG: 2 TABLET ORAL at 11:05

## 2022-07-02 RX ADMIN — ACETAMINOPHEN 1000 MG: 500 TABLET, FILM COATED ORAL at 20:41

## 2022-07-02 RX ADMIN — HYDROMORPHONE HYDROCHLORIDE 4 MG: 2 TABLET ORAL at 13:58

## 2022-07-02 RX ADMIN — HEPARIN SODIUM 5000 UNITS: 5000 INJECTION, SOLUTION INTRAVENOUS; SUBCUTANEOUS at 14:02

## 2022-07-02 RX ADMIN — HYDROMORPHONE HYDROCHLORIDE 4 MG: 2 TABLET ORAL at 20:41

## 2022-07-02 RX ADMIN — HYDROMORPHONE HYDROCHLORIDE 2 MG: 2 TABLET ORAL at 02:17

## 2022-07-02 RX ADMIN — HYDROMORPHONE HYDROCHLORIDE 4 MG: 2 TABLET ORAL at 17:40

## 2022-07-02 ASSESSMENT — ACTIVITIES OF DAILY LIVING (ADL)
ADLS_ACUITY_SCORE: 22
ADLS_ACUITY_SCORE: 22
ADLS_ACUITY_SCORE: 18
ADLS_ACUITY_SCORE: 18
ADLS_ACUITY_SCORE: 22
ADLS_ACUITY_SCORE: 22
ADLS_ACUITY_SCORE: 18
ADLS_ACUITY_SCORE: 22
ADLS_ACUITY_SCORE: 24
ADLS_ACUITY_SCORE: 22

## 2022-07-02 NOTE — PLAN OF CARE
Date & Time: 7/2 [id number]  Diagnosis: R lung collapse, S/P thoracotomy   Procedures: 7/1 - R thoracotomy & RML lobectomy w/ Dr. Reveles  Orientation/Cognitive: AOx4  VS/O2: VSS, 4L O2 NC (wean as able)  Mobility: SBA + walker   Diet: Regular  Pain Management: PRN dilaudid PO, scheduled tylenol, ONQ pump  Bowel & Bladder: Continent, no BM  Skin: R back insicion - scant drainage on steri strips, CT site - CDI  Abnormal Labs: WDL  Tele: NA  IV Access/Drips/Fluids: L PIV SL   Drains: CT - water seal, clamp @ midnight   Tests: NA  Consults: Thoracic surgery  Discharge Plan: Pending - possibly 7/4

## 2022-07-02 NOTE — PROGRESS NOTES
THORACIC SURGERY POD # 1    Doing well  Productive cough has resolved  AVSS   O2 sats 89% on RA  Good U/O  No air leak, no bleeding    CXR looks good    Satisfactory    CT to water seal  Clamp tonight    Ambulate  resp care ++    Discussed procedure and findings       SULAIMAN BROWN MD [facility] ONCOLOGY THORACIC SURGERY  CELL:  [phone]  OFFICE: [phone]

## 2022-07-02 NOTE — PLAN OF CARE
A/OX4, VSS on home CPAP 8L 02.  Tele NSR. Up w/AX1. Diet regular tolerating well. CT x1 on -20 suction, serosanguinous drainage, dressing CDI, On-Q pump infusing, sensors taped, clamps open. Pain managed with Dilaudid. Adequate urine output, ambulated to the bathroom x1, ambulated in the halls x1, pt tolerated well. No BM during shift. Pt off IMC 0600. PIVx1 L hand saline locked.

## 2022-07-03 ENCOUNTER — APPOINTMENT (OUTPATIENT)
Dept: GENERAL RADIOLOGY | Facility: CLINIC | Age: 67
DRG: 165 | End: 2022-07-03
Attending: PHYSICIAN ASSISTANT
Payer: MEDICARE

## 2022-07-03 LAB — GLUCOSE BLDC GLUCOMTR-MCNC: 113 MG/DL (ref 70–99)

## 2022-07-03 PROCEDURE — 250N000011 HC RX IP 250 OP 636: Performed by: PHYSICIAN ASSISTANT

## 2022-07-03 PROCEDURE — 999N000157 HC STATISTIC RCP TIME EA 10 MIN

## 2022-07-03 PROCEDURE — 250N000013 HC RX MED GY IP 250 OP 250 PS 637: Performed by: THORACIC SURGERY (CARDIOTHORACIC VASCULAR SURGERY)

## 2022-07-03 PROCEDURE — 120N000001 HC R&B MED SURG/OB

## 2022-07-03 PROCEDURE — 250N000013 HC RX MED GY IP 250 OP 250 PS 637: Performed by: PHYSICIAN ASSISTANT

## 2022-07-03 PROCEDURE — 71045 X-RAY EXAM CHEST 1 VIEW: CPT

## 2022-07-03 RX ADMIN — ACETAMINOPHEN 1000 MG: 500 TABLET, FILM COATED ORAL at 08:13

## 2022-07-03 RX ADMIN — SENNOSIDES AND DOCUSATE SODIUM 1 TABLET: 50; 8.6 TABLET ORAL at 21:00

## 2022-07-03 RX ADMIN — ACETAMINOPHEN 1000 MG: 500 TABLET, FILM COATED ORAL at 23:20

## 2022-07-03 RX ADMIN — HYDROMORPHONE HYDROCHLORIDE 4 MG: 2 TABLET ORAL at 04:00

## 2022-07-03 RX ADMIN — HEPARIN SODIUM 5000 UNITS: 5000 INJECTION, SOLUTION INTRAVENOUS; SUBCUTANEOUS at 06:05

## 2022-07-03 RX ADMIN — HYDROMORPHONE HYDROCHLORIDE 2 MG: 2 TABLET ORAL at 14:06

## 2022-07-03 RX ADMIN — HYDROMORPHONE HYDROCHLORIDE 4 MG: 2 TABLET ORAL at 08:14

## 2022-07-03 RX ADMIN — ATORVASTATIN CALCIUM 10 MG: 10 TABLET, FILM COATED ORAL at 08:14

## 2022-07-03 RX ADMIN — FAMOTIDINE 20 MG: 20 TABLET ORAL at 08:14

## 2022-07-03 RX ADMIN — ACETAMINOPHEN 1000 MG: 500 TABLET, FILM COATED ORAL at 14:06

## 2022-07-03 RX ADMIN — HYDROMORPHONE HYDROCHLORIDE 2 MG: 2 TABLET ORAL at 17:29

## 2022-07-03 RX ADMIN — FAMOTIDINE 20 MG: 20 TABLET ORAL at 21:01

## 2022-07-03 RX ADMIN — HEPARIN SODIUM 5000 UNITS: 5000 INJECTION, SOLUTION INTRAVENOUS; SUBCUTANEOUS at 14:04

## 2022-07-03 RX ADMIN — AMLODIPINE BESYLATE 10 MG: 10 TABLET ORAL at 08:13

## 2022-07-03 RX ADMIN — HYDROMORPHONE HYDROCHLORIDE 2 MG: 2 TABLET ORAL at 21:00

## 2022-07-03 RX ADMIN — POLYETHYLENE GLYCOL 3350 17 G: 17 POWDER, FOR SOLUTION ORAL at 08:15

## 2022-07-03 RX ADMIN — SENNOSIDES AND DOCUSATE SODIUM 1 TABLET: 50; 8.6 TABLET ORAL at 08:14

## 2022-07-03 RX ADMIN — ACETAMINOPHEN 1000 MG: 500 TABLET, FILM COATED ORAL at 17:28

## 2022-07-03 RX ADMIN — HYDROCHLOROTHIAZIDE 12.5 MG: 12.5 CAPSULE ORAL at 08:13

## 2022-07-03 RX ADMIN — HYDROMORPHONE HYDROCHLORIDE 2 MG: 2 TABLET ORAL at 11:06

## 2022-07-03 RX ADMIN — HEPARIN SODIUM 5000 UNITS: 5000 INJECTION, SOLUTION INTRAVENOUS; SUBCUTANEOUS at 23:18

## 2022-07-03 ASSESSMENT — ACTIVITIES OF DAILY LIVING (ADL)
ADLS_ACUITY_SCORE: 22

## 2022-07-03 NOTE — PROGRESS NOTES
A&O x4, VSS on CPAP 4L O2 overnight. CT clamped at midnight, unclamped to water seal at 0530, no crepitus, no airleak  Pain controlled with prn PO dilaudid and pam tylenol, On Q pump infusing, sensors taped and clamps open. R thoracotomy site WDL. LS: clear/diminished. BS: audible. -flatus. -BM. +void. CMS intact.Up SBA w/ walker.

## 2022-07-03 NOTE — PROGRESS NOTES
THORACIC SURGERY POD # 2    Doing well  AVSS on RA  Nol air leak, minimal CT output  CXR no PTX with CT clamped overnight    CT out    Satisfactory    resp care ++  Ambulate  Wean O2 off    SULAIMAN BROWN MD [facility] ONCOLOGY THORACIC SURGERY  CELL:  [phone]  OFFICE: [phone]

## 2022-07-03 NOTE — PLAN OF CARE
VSS except unable to wean off oxygen today, remains on 2L. Re-attempt later today. Pt is using IS and acapella independently. Up in halls ind. Good bowel sounds but no flatus or BM yet. CT removed by Dr Reveles today. On q pump in place. Pain control with scheduled tylenol and PRN Po dilaudid. Less dilaudid today, pt is trying to use 2 mg q 3 hours and is tolerating pain/meds well.

## 2022-07-04 ENCOUNTER — APPOINTMENT (OUTPATIENT)
Dept: GENERAL RADIOLOGY | Facility: CLINIC | Age: 67
DRG: 165 | End: 2022-07-04
Attending: THORACIC SURGERY (CARDIOTHORACIC VASCULAR SURGERY)
Payer: MEDICARE

## 2022-07-04 VITALS
OXYGEN SATURATION: 92 % | RESPIRATION RATE: 18 BRPM | DIASTOLIC BLOOD PRESSURE: 78 MMHG | HEIGHT: 65 IN | HEART RATE: 74 BPM | SYSTOLIC BLOOD PRESSURE: 148 MMHG | BODY MASS INDEX: 35.16 KG/M2 | WEIGHT: 211 LBS | TEMPERATURE: 98.4 F

## 2022-07-04 LAB — PLATELET # BLD AUTO: 290 10E3/UL (ref 150–450)

## 2022-07-04 PROCEDURE — 36415 COLL VENOUS BLD VENIPUNCTURE: CPT | Performed by: PHYSICIAN ASSISTANT

## 2022-07-04 PROCEDURE — 85049 AUTOMATED PLATELET COUNT: CPT | Performed by: PHYSICIAN ASSISTANT

## 2022-07-04 PROCEDURE — 250N000011 HC RX IP 250 OP 636: Performed by: PHYSICIAN ASSISTANT

## 2022-07-04 PROCEDURE — 250N000013 HC RX MED GY IP 250 OP 250 PS 637: Performed by: THORACIC SURGERY (CARDIOTHORACIC VASCULAR SURGERY)

## 2022-07-04 PROCEDURE — 250N000013 HC RX MED GY IP 250 OP 250 PS 637: Performed by: PHYSICIAN ASSISTANT

## 2022-07-04 PROCEDURE — 71046 X-RAY EXAM CHEST 2 VIEWS: CPT

## 2022-07-04 RX ORDER — HYDROMORPHONE HYDROCHLORIDE 2 MG/1
2-4 TABLET ORAL
Qty: 60 TABLET | Refills: 0 | Status: ON HOLD | OUTPATIENT
Start: 2022-07-04 | End: 2022-08-29

## 2022-07-04 RX ADMIN — FAMOTIDINE 20 MG: 20 TABLET ORAL at 08:05

## 2022-07-04 RX ADMIN — ATORVASTATIN CALCIUM 10 MG: 10 TABLET, FILM COATED ORAL at 08:05

## 2022-07-04 RX ADMIN — HYDROMORPHONE HYDROCHLORIDE 2 MG: 2 TABLET ORAL at 04:07

## 2022-07-04 RX ADMIN — HYDROMORPHONE HYDROCHLORIDE 2 MG: 2 TABLET ORAL at 11:00

## 2022-07-04 RX ADMIN — SENNOSIDES AND DOCUSATE SODIUM 1 TABLET: 50; 8.6 TABLET ORAL at 08:04

## 2022-07-04 RX ADMIN — AMLODIPINE BESYLATE 10 MG: 10 TABLET ORAL at 08:05

## 2022-07-04 RX ADMIN — ACETAMINOPHEN 1000 MG: 500 TABLET, FILM COATED ORAL at 08:05

## 2022-07-04 RX ADMIN — HYDROCHLOROTHIAZIDE 12.5 MG: 12.5 CAPSULE ORAL at 08:05

## 2022-07-04 RX ADMIN — HYDROMORPHONE HYDROCHLORIDE 2 MG: 2 TABLET ORAL at 08:05

## 2022-07-04 RX ADMIN — HEPARIN SODIUM 5000 UNITS: 5000 INJECTION, SOLUTION INTRAVENOUS; SUBCUTANEOUS at 06:08

## 2022-07-04 RX ADMIN — POLYETHYLENE GLYCOL 3350 17 G: 17 POWDER, FOR SOLUTION ORAL at 08:04

## 2022-07-04 RX ADMIN — HYDROMORPHONE HYDROCHLORIDE 2 MG: 2 TABLET ORAL at 00:46

## 2022-07-04 ASSESSMENT — ACTIVITIES OF DAILY LIVING (ADL)
ADLS_ACUITY_SCORE: 22

## 2022-07-04 NOTE — PLAN OF CARE
POD 3 s/p R. Thoracotomy and R, middle lobectomy  Neuro: A&O x4  Tele/cardiac: N/A  Respiration: 1-2L NC, Home CPAP when asleep  Activity: ind, walked multiple times in the [address]  Pain: incisional pain, dilaudid and tylenol given with relief  Drips: none, PIV SL  Drains/tubes: on Q pump infusing, sensors taped clamps open  Skin: thoracotomy site WDL, old chest tube site dressing changed  GI/: regular diet, no BM, passing flatus, bowel sounds hypoactive  Aggression color: green  Isolation: contact precautions    Plan: xray completed this AM,small amount of subcutaneous emphysema in the lateral aspect of the lower right chest wall again noted. wean off oxygen, review bowel regimen, taper dilaudid if pt tolerates

## 2022-07-04 NOTE — PLAN OF CARE
Goal Outcome Evaluation:    Date: July 4, 2022  Shift: [id number]  Name: Carlos Kingston  Age: 67 year old  YOB: 1955    Reason for Admission: Collapse of right lung [J98.11]  S/P thoracotomy [Z98.890]     Cognitive/Mentation: A&Ox4  Neuros/CMS: Intact    VS: Stable on 1-2L NC  Cardiac: WNL  GI: +BS, +Flatus, No BM  : Voiding in BR  Pulmonary: LS Clear/dim  Pain: To R flank/Chest, dilaudid  and tylenol given     Drains: PIV SL  Skin: R old chest tube site  Activity: Ind     Diet: Reg     Therapies recs: None  Discharge: Home today    Shift summary: Went over discharge paperwork, all questions answered. Gave Rx med to Pt. Pt left with all belongings.

## 2022-07-04 NOTE — PROGRESS NOTES
THORACIC SURGERY POD #3    Doing well  AVSS on RA O2 sats 94%  Good cough  Wound OK  CXR no ptx or effusion    Ok to D/C today    SULAIMAN BROWN MD [facility] ONCOLOGY THORACIC SURGERY  CELL:  [phone]  OFFICE: [phone]

## 2022-07-05 ENCOUNTER — PATIENT OUTREACH (OUTPATIENT)
Dept: CARE COORDINATION | Facility: CLINIC | Age: 67
End: 2022-07-05

## 2022-07-05 DIAGNOSIS — Z71.89 OTHER SPECIFIED COUNSELING: ICD-10-CM

## 2022-07-05 LAB
PATH REPORT.COMMENTS IMP SPEC: NORMAL
PATH REPORT.COMMENTS IMP SPEC: NORMAL
PATH REPORT.FINAL DX SPEC: NORMAL
PATH REPORT.GROSS SPEC: NORMAL
PATH REPORT.MICROSCOPIC SPEC OTHER STN: NORMAL
PATH REPORT.RELEVANT HX SPEC: NORMAL
PHOTO IMAGE: NORMAL

## 2022-07-05 PROCEDURE — 88307 TISSUE EXAM BY PATHOLOGIST: CPT | Mod: 26 | Performed by: PATHOLOGY

## 2022-07-06 LAB — GLUCOSE BLDC GLUCOMTR-MCNC: 158 MG/DL (ref 70–99)

## 2022-07-11 ENCOUNTER — ANCILLARY PROCEDURE (OUTPATIENT)
Dept: GENERAL RADIOLOGY | Facility: CLINIC | Age: 67
End: 2022-07-11
Attending: THORACIC SURGERY (CARDIOTHORACIC VASCULAR SURGERY)
Payer: MEDICARE

## 2022-07-11 DIAGNOSIS — J98.19 PULMONARY COLLAPSE: ICD-10-CM

## 2022-07-11 PROCEDURE — 71046 X-RAY EXAM CHEST 2 VIEWS: CPT | Mod: TC | Performed by: RADIOLOGY

## 2022-07-11 NOTE — PROGRESS NOTES
Results faxed:  Dr Steven Reveles, [facility]  Fax # [phone]    Patient has post-op appt TODAY 7- @ 1586    RT Cipriano (R)

## 2022-07-11 NOTE — DISCHARGE SUMMARY
THORACIC SURGERY HOSPITAL DISCHARGE SUMMARY  [facility] ONCOLOGY - THORACIC SURGERY  [address], [address]  [place] [address]  Phone [phone]  [email or web address]    7/11/2022     No Ref-Primary, Physician   No address on file  Phone: None   Fax: [phone]        Re: Carlos Kingston             1955             [id number]              Dates of Hospitalization: 7/1/2022 - 7/4/2022   Date of Service (when I saw the patient): 7/4/22    Dear Dr. Fofana Ref-Primary, Physician:     As you are aware, we had the pleasure of caring for your patient,  Carlos Kingston here at [facility].  He is a 67-year-old gentleman with a history of a foreign body in the right middle lobe bronchus.  Bronchoscopy was done in 08/2020 and the foreign body was removed.  Despite this, he has been dealing with severe, chronic cough.  Repeat bronchoscopy on 04/27/2022 noted that the exam was unremarkable.  CT scan dated 04/18/2022 showed complete collapse or atelectasis of the right middle lobe lung.  Findings are consistent with recurrent chronic atelectasis of the right middle lobe lung from a chronic prior obstruction of the middle bronchus from a foreign body.  Because of the severity of his symptoms, a middle lobectomy was performed.    On 7/1/2022, Dr. Steven Reveles performed the following:    Procedure/Surgery Information   Procedure: Procedure(s):  RIGHT THORACOTOMY  RIGHT MIDDLE LOBECTOMY   Surgeon(s): Surgeon(s) and Role:     * Steven Reveles MD - Primary     * Julieta Thompson PA-C - Assisting     * Keyona Escobar PA-C - Assisting   Specimens: ID Type Source Tests Collected by Time Destination   1 : RIGHT MIDDLE LOBE LUNG Tissue Lung, Middle Lobe, Right SURGICAL PATHOLOGY EXAM Steven Reveles MD 7/1/2022 11:30 AM             Final Surgical Pathology Revealed:  Benign right middle lobe lung with separate areas of emphysematous change  and other areas of parenchymal collapse. 4 benign lymph nodes with anthracosis    His post-operative course was unremarkable.      Consultations This Hospital Stay   None    Carlos Kingston has otherwise recovered sufficiently to be discharged to home today, 7/4/2022, on post-operative day number three for further convalescence.  His incisions are healing well with no signs or symptoms of infection.  His bowels have moved sufficiently and he is tolerating diet and activity, ambulating and transferring independently.  He is currently afebrile with stable vital signs.     Below, you will find a full discharge medication list and instructions.  We have arranged for Carlos Kingston to follow-up with us in our [facility] in 7-10 days with a Chest X-ray prior to that appointment.  We thank you for allowing us to participate in the care of Carlos Kingston here at [facility].  Please feel free to contact our office at [phone] with any questions or concerns or if we can be of any further assistance in the care of this patient.    Sincerely,    Dr. Steven Reveles MD    D/C Summary Prepared by: Julieta Thompson PA-C    Discharge Medications:  Discharge Medication List as of 7/4/2022 12:12 PM      START taking these medications    Details   HYDROmorphone (DILAUDID) 2 MG tablet Take 1-2 tablets (2-4 mg) by mouth every 3 hours as needed, Disp-60 tablet, R-0, Local Print         CONTINUE these medications which have NOT CHANGED    Details   acetaminophen-caffeine (EXCEDRIN TENSION HEADACHE) 500-65 MG TABS Take 2 tablets by mouth every 6 hours as needed for mild pain, Historical      amLODIPine (NORVASC) 5 MG tablet Take 10 mg by mouth daily (2 x 5 mg = 10 mg), Historical      amoxicillin (AMOXIL) 500 MG capsule Take 2,000 mg by mouth once as needed (one hour prior to dental appointment 4 x 500 mg = 2000 mg), Historical      atorvastatin (LIPITOR) 10 MG tablet Take 1 tablet (10 mg) by mouth daily, Disp-90 tablet,  R-1, E-Prescribe      Ergocalciferol (VITAMIN D2 PO) Take 1-2 capsules by mouth daily, Historical      hydrochlorothiazide (HYDRODIURIL) 12.5 MG tablet Take 12.5 mg by mouth daily, Historical               Discharge Instructions:  1) Remove chest tube dressing on 7/05/22 and then it is Ok to shower.  Please wash both incision and chest tube site daily with soap and water.  You may cover the chest tube site daily with a clean band-aid or dry gauze if it continues to drain.  Once it stops draining, leave the site open to air and it will form a scab.  2) Steri-strips can be removed in 1 week or they will fall off when they are ready.  3) Continue daily use of your Incentive Spirometer, set of 10x in a row, every 1-2 hours while you are awake during the day. Also use your flutter valve if you received one during your stay.   4) No lifting, pushing or pulling >10 lbs for 4 weeks from the day of your surgery.    5) No driving while on narcotic pain medications.    Follow-Up Care:  1) Follow up with Julieta Thompson PA-C/Dr. Reveles at the [facility] in [place] ([address] [place] [address], [address], [place] [address]).  Call Rosaura at [phone] to schedule the appointment.  2) Follow up with Primary Care Provider, No Ref-Primary, Physician within 1 month of discharge for routine post-surgical care, wound check and follow up.  Please call None to arrange this appointment.       CC  Patient Care Team:  No Ref-Primary, Physician as PCP - General  Elliott Rocha MD as MD (Urology)  Yaron Shetty MD as MD (Urology)  Chloe Samayoa, RN as Specialty Care Coordinator (Urology)  System, Provider Not In as Referring Physician (Clinic)

## 2022-08-08 ENCOUNTER — ANCILLARY PROCEDURE (OUTPATIENT)
Dept: CT IMAGING | Facility: CLINIC | Age: 67
End: 2022-08-08
Attending: THORACIC SURGERY (CARDIOTHORACIC VASCULAR SURGERY)
Payer: MEDICARE

## 2022-08-08 DIAGNOSIS — R05.9 COUGH: ICD-10-CM

## 2022-08-08 PROCEDURE — 71250 CT THORAX DX C-: CPT

## 2022-08-25 ENCOUNTER — TRANSFERRED RECORDS (OUTPATIENT)
Dept: HEALTH INFORMATION MANAGEMENT | Facility: CLINIC | Age: 67
End: 2022-08-25

## 2022-08-25 LAB
CHOLESTEROL (EXTERNAL): 190 MG/DL (ref 0–200)
CREATININE (EXTERNAL): 1.3 MG/DL (ref 0.8–1.5)
GFR ESTIMATED (EXTERNAL): 59 ML/MIN (ref 60–137)
GLUCOSE (EXTERNAL): 112 MG/DL (ref 70–110)
HBA1C MFR BLD: 6.2 % (ref 4.8–5.6)
HDLC SERPL-MCNC: 54 MG/DL (ref 34–100)
LDL CHOLESTEROL CALCULATED (EXTERNAL): 113 MG/DL (ref 75–130)
POTASSIUM (EXTERNAL): 5.1 MMOL/L (ref 3.5–5.5)
TRIGLYCERIDES (EXTERNAL): 116 MG/DL (ref 0–200)

## 2022-08-29 ENCOUNTER — ANESTHESIA (OUTPATIENT)
Dept: SURGERY | Facility: CLINIC | Age: 67
End: 2022-08-29
Payer: MEDICARE

## 2022-08-29 ENCOUNTER — ANESTHESIA EVENT (OUTPATIENT)
Dept: SURGERY | Facility: CLINIC | Age: 67
End: 2022-08-29
Payer: MEDICARE

## 2022-08-29 ENCOUNTER — HOSPITAL ENCOUNTER (OUTPATIENT)
Facility: CLINIC | Age: 67
Discharge: HOME | End: 2022-08-29
Attending: THORACIC SURGERY (CARDIOTHORACIC VASCULAR SURGERY) | Admitting: THORACIC SURGERY (CARDIOTHORACIC VASCULAR SURGERY)
Payer: MEDICARE

## 2022-08-29 VITALS
OXYGEN SATURATION: 93 % | BODY MASS INDEX: 34.74 KG/M2 | DIASTOLIC BLOOD PRESSURE: 78 MMHG | SYSTOLIC BLOOD PRESSURE: 135 MMHG | WEIGHT: 208.5 LBS | RESPIRATION RATE: 16 BRPM | HEIGHT: 65 IN | TEMPERATURE: 96.9 F | HEART RATE: 55 BPM

## 2022-08-29 LAB — POTASSIUM BLD-SCNC: 4 MMOL/L (ref 3.4–5.3)

## 2022-08-29 PROCEDURE — 36415 COLL VENOUS BLD VENIPUNCTURE: CPT | Performed by: ANESTHESIOLOGY

## 2022-08-29 PROCEDURE — 84132 ASSAY OF SERUM POTASSIUM: CPT | Performed by: ANESTHESIOLOGY

## 2022-08-29 PROCEDURE — 710N000012 HC RECOVERY PHASE 2, PER MINUTE: Performed by: THORACIC SURGERY (CARDIOTHORACIC VASCULAR SURGERY)

## 2022-08-29 PROCEDURE — 710N000009 HC RECOVERY PHASE 1, LEVEL 1, PER MIN: Performed by: THORACIC SURGERY (CARDIOTHORACIC VASCULAR SURGERY)

## 2022-08-29 PROCEDURE — 360N000076 HC SURGERY LEVEL 3, PER MIN: Performed by: THORACIC SURGERY (CARDIOTHORACIC VASCULAR SURGERY)

## 2022-08-29 PROCEDURE — 258N000003 HC RX IP 258 OP 636: Performed by: ANESTHESIOLOGY

## 2022-08-29 PROCEDURE — 250N000011 HC RX IP 250 OP 636: Performed by: ANESTHESIOLOGY

## 2022-08-29 PROCEDURE — 250N000009 HC RX 250: Performed by: ANESTHESIOLOGY

## 2022-08-29 PROCEDURE — 999N000141 HC STATISTIC PRE-PROCEDURE NURSING ASSESSMENT: Performed by: THORACIC SURGERY (CARDIOTHORACIC VASCULAR SURGERY)

## 2022-08-29 PROCEDURE — 370N000017 HC ANESTHESIA TECHNICAL FEE, PER MIN: Performed by: THORACIC SURGERY (CARDIOTHORACIC VASCULAR SURGERY)

## 2022-08-29 RX ORDER — PROPOFOL 10 MG/ML
INJECTION, EMULSION INTRAVENOUS PRN
Status: DISCONTINUED | OUTPATIENT
Start: 2022-08-29 | End: 2022-08-29

## 2022-08-29 RX ORDER — PROPOFOL 10 MG/ML
INJECTION, EMULSION INTRAVENOUS CONTINUOUS PRN
Status: DISCONTINUED | OUTPATIENT
Start: 2022-08-29 | End: 2022-08-29

## 2022-08-29 RX ORDER — DEXAMETHASONE SODIUM PHOSPHATE 4 MG/ML
INJECTION, SOLUTION INTRA-ARTICULAR; INTRALESIONAL; INTRAMUSCULAR; INTRAVENOUS; SOFT TISSUE PRN
Status: DISCONTINUED | OUTPATIENT
Start: 2022-08-29 | End: 2022-08-29

## 2022-08-29 RX ORDER — LIDOCAINE HYDROCHLORIDE 20 MG/ML
INJECTION, SOLUTION INFILTRATION; PERINEURAL PRN
Status: DISCONTINUED | OUTPATIENT
Start: 2022-08-29 | End: 2022-08-29

## 2022-08-29 RX ORDER — ONDANSETRON 2 MG/ML
INJECTION INTRAMUSCULAR; INTRAVENOUS PRN
Status: DISCONTINUED | OUTPATIENT
Start: 2022-08-29 | End: 2022-08-29

## 2022-08-29 RX ORDER — ACETAMINOPHEN 325 MG/1
650 TABLET ORAL
Status: DISCONTINUED | OUTPATIENT
Start: 2022-08-29 | End: 2022-08-29 | Stop reason: HOSPADM

## 2022-08-29 RX ORDER — LIDOCAINE 40 MG/G
CREAM TOPICAL
Status: DISCONTINUED | OUTPATIENT
Start: 2022-08-29 | End: 2022-08-29 | Stop reason: HOSPADM

## 2022-08-29 RX ORDER — FENTANYL CITRATE 50 UG/ML
INJECTION, SOLUTION INTRAMUSCULAR; INTRAVENOUS PRN
Status: DISCONTINUED | OUTPATIENT
Start: 2022-08-29 | End: 2022-08-29

## 2022-08-29 RX ORDER — SODIUM CHLORIDE, SODIUM LACTATE, POTASSIUM CHLORIDE, CALCIUM CHLORIDE 600; 310; 30; 20 MG/100ML; MG/100ML; MG/100ML; MG/100ML
INJECTION, SOLUTION INTRAVENOUS CONTINUOUS
Status: DISCONTINUED | OUTPATIENT
Start: 2022-08-29 | End: 2022-08-29 | Stop reason: HOSPADM

## 2022-08-29 RX ADMIN — SODIUM CHLORIDE, POTASSIUM CHLORIDE, SODIUM LACTATE AND CALCIUM CHLORIDE: 600; 310; 30; 20 INJECTION, SOLUTION INTRAVENOUS at 08:35

## 2022-08-29 RX ADMIN — LIDOCAINE HYDROCHLORIDE 100 MG: 20 INJECTION, SOLUTION INFILTRATION; PERINEURAL at 08:40

## 2022-08-29 RX ADMIN — ROCURONIUM BROMIDE 50 MG: 50 INJECTION, SOLUTION INTRAVENOUS at 08:40

## 2022-08-29 RX ADMIN — MIDAZOLAM 2 MG: 1 INJECTION INTRAMUSCULAR; INTRAVENOUS at 08:35

## 2022-08-29 RX ADMIN — SUGAMMADEX 400 MG: 100 INJECTION, SOLUTION INTRAVENOUS at 08:50

## 2022-08-29 RX ADMIN — FENTANYL CITRATE 100 MCG: 50 INJECTION, SOLUTION INTRAMUSCULAR; INTRAVENOUS at 08:40

## 2022-08-29 RX ADMIN — PROPOFOL 200 MG: 10 INJECTION, EMULSION INTRAVENOUS at 08:40

## 2022-08-29 RX ADMIN — DEXAMETHASONE SODIUM PHOSPHATE 4 MG: 4 INJECTION, SOLUTION INTRA-ARTICULAR; INTRALESIONAL; INTRAMUSCULAR; INTRAVENOUS; SOFT TISSUE at 08:50

## 2022-08-29 RX ADMIN — PROPOFOL 150 MCG/KG/MIN: 10 INJECTION, EMULSION INTRAVENOUS at 08:45

## 2022-08-29 RX ADMIN — ONDANSETRON 4 MG: 2 INJECTION INTRAMUSCULAR; INTRAVENOUS at 08:50

## 2022-08-29 ASSESSMENT — ENCOUNTER SYMPTOMS
SEIZURES: 0
DYSRHYTHMIAS: 0

## 2022-08-29 ASSESSMENT — ACTIVITIES OF DAILY LIVING (ADL): ADLS_ACUITY_SCORE: 35

## 2022-08-29 ASSESSMENT — LIFESTYLE VARIABLES: TOBACCO_USE: 0

## 2022-08-29 NOTE — PROGRESS NOTES
H&P Update:      H&P signed by Jacinto Provider at 7/1/2022 11:09 PM     Author: Jacinto Provider Service: -- Author Type: Physician   Filed: 7/1/2022 11:09 PM Date of Service: 7/1/2022 11:07 PM Creation Time: 7/1/2022 11:09 PM   Status: Signed : Jacinto Provider (Physician)         Scan on 7/1/2022 11:09 PM by Jacinto, Provider: [facility] H/P 6/2/22         Heart: RRR, no murmur  Lungs: end-expiratory wheeze  Persistent cough  No other acute change in health    OK to proceed    Julieta Thompson PA-C with Dr. Steven Reveles  [facility]  Cell [phone]

## 2022-08-29 NOTE — ANESTHESIA PREPROCEDURE EVALUATION
Anesthesia Pre-Procedure Evaluation    Patient: Carlos Kingston   MRN: [id number] : 1955        Procedure : Procedure(s):  FLEXIBLE BRONCHOSCOPY          Past Medical History:   Diagnosis Date     Acute kidney failure, unspecified (H)      Anterior subcapsular polar age-related cataract of left eye      Arthritis     Bilateral knees     Cataract      Daytime somnolence      History of acute bronchitis      History of dermatophytosis      History of eye trauma      History of prostatitis      Hyperlipidemia      Hypertension      Knee joint replacement status      Male erectile disorder      Methicillin resistant Staphylococcus aureus culture positive      Migraines      Osteoarthrosis      Other chronic pain     Migraine headaches     Pneumonia of right middle lobe due to infectious organism      S/P cervical spinal fusion      Sleep apnea      Snoring      Tubular adenoma of colon      Vitamin D deficiency disease       Past Surgical History:   Procedure Laterality Date     ANKLE SURGERY Right     Debridement     ARTHROPLASTY HIP Right 2014    Procedure: ARTHROPLASTY HIP;  Surgeon: Elliott Gonzalez MD;  Location: [facility]     ARTHROPLASTY KNEE BILATERAL Bilateral 2019    Procedure: 1.  Left total knee arthroplasty.  2.  Right total knee arthroplasty.;  Surgeon: Elliott Gonzalez MD;  Location: [facility]     ARTHROSCOPY KNEE Right      ARTHROSCOPY KNEE WITH MEDIAL MENISCECTOMY  2013    Procedure: ARTHROSCOPY KNEE WITH MEDIAL MENISCECTOMY;  RIGHT ARTHROSCOPY KNEE WITH MEDIAL MENISCECTOMY;  Surgeon: Elliott Gonzalez MD;  Location: [facility]     CARPAL TUNNEL RELEASE RT/LT Bilateral      COLONOSCOPY       DISCECTOMY, FUSION CERVICAL ANTERIOR TWO LEVELS, COMBINED N/A 2014    Procedure: COMBINED DISCECTOMY, FUSION CERVICAL ANTERIOR TWO LEVELS;  Surgeon: Mainor Adan MD;  Location: [facility]     IMPLANT PROSTHESIS PENIS INFLATABLE N/A 2020    Procedure: PLACEMENT OF COLOPLAST PENILE PROSTHESIS;   Surgeon: Yaron Shetty MD;  Location: [facility]     LOBECTOMY LUNG Right 7/1/2022    Procedure: RIGHT MIDDLE LOBECTOMY;  Surgeon: Steven Reveles MD;  Location: [facility]     ORTHOPEDIC SURGERY      right ankle arthroscopy     ORTHOPEDIC SURGERY      left hip replacment     ORTHOPEDIC SURGERY      bilateral carpal tunnel     SOFT TISSUE SURGERY      fatty lump removed rt. arm     THORACOTOMY Right 7/1/2022    Procedure: RIGHT THORACOTOMY;  Surgeon: Steven Reveles MD;  Location: [facility]     VASECTOMY       ZZC TOTAL HIP ARTHROPLASTY Left       No Known Allergies   Social History     Tobacco Use     Smoking status: Never Smoker     Smokeless tobacco: Never Used   Substance Use Topics     Alcohol use: Yes     Comment: 3-4 drinks/week      Wt Readings from Last 1 Encounters:   08/29/22 94.6 kg (208 lb 8 oz)        Prior to Admission medications    Medication Sig Start Date End Date Taking? Authorizing Provider   acetaminophen-caffeine (EXCEDRIN TENSION HEADACHE) 500-65 MG TABS Take 2 tablets by mouth every 6 hours as needed for mild pain   Yes Reported, Patient   amLODIPine (NORVASC) 5 MG tablet Take 10 mg by mouth daily (2 x 5 mg = 10 mg)   Yes Reported, Patient   atorvastatin (LIPITOR) 10 MG tablet Take 1 tablet (10 mg) by mouth daily 9/2/16  Yes Trudy King MD   Ergocalciferol (VITAMIN D2 PO) Take 1-2 capsules by mouth daily   Yes Reported, Patient   hydrochlorothiazide (HYDRODIURIL) 12.5 MG tablet Take 12.5 mg by mouth daily   Yes Reported, Patient   amoxicillin (AMOXIL) 500 MG capsule Take 2,000 mg by mouth once as needed (one hour prior to dental appointment 4 x 500 mg = 2000 mg)    Reported, Patient   HYDROmorphone (DILAUDID) 2 MG tablet Take 1-2 tablets (2-4 mg) by mouth every 3 hours as needed 7/4/22   Steven Reveles MD     RECENT LABS:   ECG:   ECHO:   CXR:      Anesthesia Evaluation   Pt has had prior anesthetic. Type: General.    No history of anesthetic complications       ROS/MED  HX  ENT/Pulmonary: Comment: Chronic cough s/p aspiration and middle lobectomy - never smoker    (+) sleep apnea,  (-) tobacco use and asthma   Neurologic:     (+) migraines,  (-) no seizures and no CVA   Cardiovascular:     (+) Dyslipidemia hypertension----- (-) CAD, [place], arrhythmias and valvular problems/murmurs   METS/Exercise Tolerance:     Hematologic:    (-) history of blood clots and anemia   Musculoskeletal:   (+) arthritis,     GI/Hepatic:    (-) GERD and liver disease   Renal/Genitourinary:     (+) renal disease,  (-) nephrolithiasis   Endo:    (-) Type I DM, Type II DM, thyroid disease and obesity   Psychiatric/Substance Use:    (-) psychiatric history   Infectious Disease:    (-) Recent Fever   Malignancy:       Other:      (+) , H/O Chronic Pain,        Physical Exam    Airway        Mallampati: III   TM distance: > 3 FB   Neck ROM: full   Mouth opening: > 3 cm    Respiratory Devices and Support         Dental  no notable dental history         Cardiovascular   cardiovascular exam normal       Rhythm and rate: normal     Pulmonary           (+) wheezes           OUTSIDE LABS:  CBC:   Lab Results   Component Value Date    WBC 7.4 07/01/2022    WBC 9.5 06/27/2022    HGB 12.8 (L) 07/02/2022    HGB 14.2 07/01/2022    HCT 42.6 07/01/2022    HCT 43.1 06/27/2022     07/04/2022     07/01/2022     BMP:   Lab Results   Component Value Date     07/01/2022     07/01/2022    POTASSIUM 4.0 08/29/2022    POTASSIUM 4.2 07/01/2022    CHLORIDE 109 07/01/2022    CHLORIDE 111 (H) 07/01/2022    CO2 23 07/01/2022    CO2 24 07/01/2022    BUN 21 07/01/2022    BUN 20 07/01/2022    CR 1.08 07/01/2022    CR 1.11 07/01/2022     (H) 07/03/2022     (H) 07/02/2022     COAGS:   Lab Results   Component Value Date    INR 0.99 07/01/2022     POC:   Lab Results   Component Value Date     (H) 08/04/2020     HEPATIC:   Lab Results   Component Value Date    ALBUMIN 4.2 08/18/2016    PROTTOTAL 6.7  (L) 08/18/2016    ALT 44 08/18/2016    AST 25 08/18/2016    ALKPHOS 63 08/18/2016    BILITOTAL 0.8 08/18/2016     OTHER:   Lab Results   Component Value Date    VADIM 8.7 07/01/2022    CRP <5.0 03/29/2012    SED 4 03/29/2012       Anesthesia Plan    ASA Status:  3      Anesthesia Type: General.     - Airway: ETT   Induction: Propofol.   Maintenance: Balanced.        Consents    Anesthesia Plan(s) and associated risks, benefits, and realistic alternatives discussed. Questions answered and patient/representative(s) expressed understanding.    - Discussed:     - Discussed with:  Patient         Postoperative Care    Pain management: Multi-modal analgesia.   PONV prophylaxis: Ondansetron (or other 5HT-3), Dexamethasone or Solumedrol     Comments:                Jessica Coles MD

## 2022-08-29 NOTE — DISCHARGE INSTRUCTIONS
Same Day Surgery Discharge Instructions for  Sedation and General Anesthesia     It's not unusual to feel dizzy, light-headed or faint for up to 24 hours after surgery or while taking pain medication.  If you have these symptoms: sit for a few minutes before standing and have someone assist you when you get up to walk or use the bathroom.    You should rest and relax for the next 24 hours. We recommend you make arrangements to have an adult stay with you for at least 24 hours after your discharge.  Avoid hazardous and strenuous activity.    DO NOT DRIVE any vehicle or operate mechanical equipment for 24 hours following the end of your surgery.  Even though you may feel normal, your reactions may be affected by the medication you have received.    Do not drink alcoholic beverages for 24 hours following surgery.     Slowly progress to your regular diet as you feel able. It's not unusual to feel nauseated and/or vomit after receiving anesthesia.  If you develop these symptoms, drink clear liquids (apple juice, ginger ale, broth, 7-up, etc. ) until you feel better.  If your nausea and vomiting persists for 24 hours, please notify your surgeon.      All narcotic pain medications, along with inactivity and anesthesia, can cause constipation. Drinking plenty of liquids and increasing fiber intake will help.    For any questions of a medical nature, call your surgeon.    Do not make important decisions for 24 hours.    If you had general anesthesia, you may have a sore throat for a couple of days related to the breathing tube used during surgery.  You may use Cepacol lozenges to help with this discomfort.  If it worsens or if you develop a fever, contact your surgeon.     If you feel your pain is not well managed with the pain medications prescribed by your surgeon, please contact your surgeon's office to let them know so they can address your concerns.      Discharge Instructions for Bronchoscopy  Recovering at Home  Once  home, follow any instructions you have been given. These include:  Take pain medication as directed.  Do not eat or drink until swallowing returns to normal. As soon as you can swallow comfortably, drink plenty of water.  Use throat lozenges as advised by your doctor to help ease throat soreness.  Rest your voice as instructed by your doctor  Activity as tolerated--frequent short walks followed by rest periods is recommended.  When to Call the Doctor  After you get home, call the doctor if you have any of the following:  Chest pain or trouble breathing (call 911 or other emergency service)  Fever of 100.4 F (38 C) or higher, or as directed by your healthcare provider  Trouble swallowing doesn t improve or gets worse  Pain that does not go away even after taking pain medication  Severely hoarse voice  Severe nausea or vomiting  Bloody vomit  Cough that brings up more than tiny specks of blood   Follow-Up       Follow up with surgeon as instructed.              **If you have questions or concerns about your procedure,  call Dr. Reveles at [phone]**

## 2022-08-29 NOTE — OR NURSING
Patient brought a picture of home covid antigen test on phone as directed.  I personally saw this result and it was time stamped 8/28/2022 @ 5934.  Result was negative.

## 2022-08-29 NOTE — ANESTHESIA CARE TRANSFER NOTE
Patient: Carlos Kingston    Procedure: Procedure(s):  FLEXIBLE BRONCHOSCOPY       Diagnosis: Persistent cough [R05.3]  Diagnosis Additional Information: No value filed.    Anesthesia Type:   No value filed.     Note:    Oropharynx: oropharynx clear of all foreign objects  Level of Consciousness: awake  Oxygen Supplementation: face mask    Independent Airway: airway patency satisfactory and stable  Dentition: dentition unchanged  Vital Signs Stable: post-procedure vital signs reviewed and stable  Report to RN Given: handoff report given  Patient transferred to: PACU    Handoff Report: Identifed the Patient, Identified the Reponsible Provider, Reviewed the pertinent medical history, Discussed the surgical course, Reviewed Intra-OP anesthesia mangement and issues during anesthesia, Set expectations for post-procedure period and Allowed opportunity for questions and acknowledgement of understanding          Electronically Signed By: ANNETTE Mathews CRNA  August 29, 2022  9:04 AM

## 2022-08-29 NOTE — OP NOTE
Procedure Date: 2022    SURGEON:  Steven Reveles MD    ASSISTANT:  Julieta Thompson PA-C    PREOPERATIVE DIAGNOSIS:  Persistent severe cough, status post right middle lobectomy for a chronic complete atelectasis, right middle lobe lung.    POSTOPERATIVE DIAGNOSIS:  Persistent severe cough, status post right middle lobectomy for a chronic complete atelectasis, right middle lobe lung.    PROCEDURE:  Diagnostic flexible bronchoscopy.    ANESTHESIA:  General.    INDICATIONS FOR PROCEDURE:  A 67-year-old gentleman who had severe cough.  He was found to have a chronic complete atelectasis of the middle lobe lung after foreign body in the middle bronchus.  He did well initially after middle lobectomy.  His cough has recurred.  CT scan shows some minimal infiltrate at the periphery of the right lower lobe lung.  It was treated with antibiotics with no improvement of his cough.  Based on the findings, a flexible bronchoscopy is indicated to rule out the presence of endobronchial anomalies would explain his cough.    DESCRIPTION OF PROCEDURE:  The patient was brought and placed in supine position under general anesthesia with double endotracheal tube.  The flexible bronchoscope was introduced.  Careful examination of the entire bronchial tree was done down to the subsegmental bronchus.  There was some scant thick secretions, which was aspirated.  The left side was normal down to the subsegmental bronchi of each bronchus.  On the right side, the stump of the right middle lobectomy was intact.  Remainder of the exam was unremarkable.  The patient was returned to recovery room in satisfactory condition.    Steven Reveles MD        D: 2022   T: 2022   MT: [facility]    Name:     ANTONY PERRY  MRN:      [id number]        Account:        [id number]   :      1955           Procedure Date: 2022     Document: [id number]

## 2022-08-29 NOTE — BRIEF OP NOTE
[facility]    Brief Operative Note    Pre-operative diagnosis: Persistent cough [R05.3]  Post-operative diagnosis persistent cough    Procedure: Procedure(s):  FLEXIBLE BRONCHOSCOPY  Surgeon: Surgeon(s) and Role:     * Steven Reveles MD - Primary     * Julieta Thompson PA-C - Assisting  Anesthesia: General   Estimated Blood Loss: none    Drains: None  Specimens: * No specimens in log *  Findings:   Right middle lobe bronchial stump without abnormality. Copious thick white secretions present. No mucosal abnormalities.  Complications: None.  Implants: * No implants in log *

## 2022-08-29 NOTE — ANESTHESIA POSTPROCEDURE EVALUATION
Patient: Carlos Kingston    Procedure: Procedure(s):  FLEXIBLE BRONCHOSCOPY       Anesthesia Type:  General    Note:  Disposition: Admission   Postop Pain Control: Uneventful            Sign Out: Well controlled pain   PONV: No   Neuro/Psych: Uneventful            Sign Out: Acceptable/Baseline neuro status   Airway/Respiratory: Uneventful            Sign Out: Acceptable/Baseline resp. status   CV/Hemodynamics: Uneventful            Sign Out: Acceptable CV status; No obvious hypovolemia; No obvious fluid overload   Other NRE: NONE   DID A NON-ROUTINE EVENT OCCUR? No           Last vitals:  Vitals Value Taken Time   /69 08/29/22 0945   Temp 36.1  C (96.9  F) 08/29/22 0945   Pulse 53 08/29/22 0955   Resp 18 08/29/22 0955   SpO2 93 % 08/29/22 0956   Vitals shown include unvalidated device data.    Electronically Signed By: Jessica Coles MD  August 29, 2022  3:26 PM

## 2022-09-27 ENCOUNTER — TRANSCRIBE ORDERS (OUTPATIENT)
Dept: OTHER | Age: 67
End: 2022-09-27

## 2022-09-27 DIAGNOSIS — R13.10 DYSPHAGIA: Primary | ICD-10-CM

## 2022-10-03 ENCOUNTER — HOSPITAL ENCOUNTER (OUTPATIENT)
Dept: CT IMAGING | Facility: CLINIC | Age: 67
Discharge: HOME | End: 2022-10-03
Attending: OTOLARYNGOLOGY | Admitting: OTOLARYNGOLOGY
Payer: MEDICARE

## 2022-10-03 DIAGNOSIS — R05.3 CHRONIC COUGH: ICD-10-CM

## 2022-10-03 PROCEDURE — 70486 CT MAXILLOFACIAL W/O DYE: CPT

## 2022-10-06 ENCOUNTER — HOSPITAL ENCOUNTER (OUTPATIENT)
Dept: GENERAL RADIOLOGY | Facility: CLINIC | Age: 67
Discharge: HOME | End: 2022-10-06
Attending: OTOLARYNGOLOGY
Payer: MEDICARE

## 2022-10-06 ENCOUNTER — HOSPITAL ENCOUNTER (OUTPATIENT)
Dept: SPEECH THERAPY | Facility: CLINIC | Age: 67
Discharge: HOME | End: 2022-10-06
Attending: OTOLARYNGOLOGY
Payer: MEDICARE

## 2022-10-06 DIAGNOSIS — R13.10 DYSPHAGIA: ICD-10-CM

## 2022-10-06 PROCEDURE — 74230 X-RAY XM SWLNG FUNCJ C+: CPT

## 2022-10-06 PROCEDURE — 92611 MOTION FLUOROSCOPY/SWALLOW: CPT | Mod: GN

## 2022-10-06 PROCEDURE — 74220 X-RAY XM ESOPHAGUS 1CNTRST: CPT

## 2022-10-06 PROCEDURE — 255N000001 HC RX 255: Performed by: OTOLARYNGOLOGY

## 2022-10-06 RX ADMIN — ANTACID/ANTIFLATULENT 4 G: 380; 550; 10; 10 GRANULE, EFFERVESCENT ORAL at 13:33

## 2022-10-06 NOTE — PROGRESS NOTES
"   10/06/22 1525   General Information   Type Of Visit Initial   Start Of Care Date 10/06/22   Referring Physician Arash Castellano MD   Orders Evaluate And Treat   Medical Diagnosis Dysphagia   Onset Of Illness/injury Or Date Of Surgery 10/06/22   Pertinent History of Current Problem/OT: Additional Occupational Profile Info Pt lives at home independently with his wife. He has a hx of cerbical spine fusion (C4-5-6) (pt unsure of date, but reports it was many years ago), R lobectomy in July 2022. Referred for videoswallow and esophagram due to chronic cough.   Respiratory Status Room air   Prior Level Of Function Swallowing   Prior Level Of Function Comment consumes a regular diet/thin liquid without notable concern for difficulty   Living Environment House/[facility]   General Observations Alert, cooperative, very motivated to determine cause of cough   Patient/family Goals to determine cause of chronic cough   Abuse Screen (yes response referral indicated)   Feels Unsafe at Home or Work/School no   Feels Threatened by Someone no   Does Anyone Try to Keep You From Having Contact with Others or Doing Things Outside Your Home? no   Physical Signs of Abuse Present no   Clinical Swallow Evaluation   Oral Musculature generally intact   Structural Abnormalities none present   Dentition present and adequate   VFSS Evaluation   VFSS Additional Documentation Yes   VFSS Eval: Radiology   Radiologist Dr. Rogers   Views Taken left lateral   Physical Location of Procedure [facility] (Radiology Department)   VFSS Eval: Thin Liquid Texture Trial   Mode of Presentation, Thin Liquid cup;straw;self-fed   Order of Presentation 1,4,5   Preparatory Phase WFL   Pharyngeal Phase, Thin Liquid WFL   Rosenbek's Penetration Aspiration Scale: Thin Liquid Trial Results 1 - no aspiration, contrast does not enter airway  (flash penetration with no residue with very large, consecutive \"chug a lug\" type " swallow)   Diagnostic Statement Flash penetration when challenged with very large consecutive swallows, no other penetration, no aspiration. Swallow WNL with thin consistency.   VFSS Eval: Puree Solid Texture Trial   Mode of Presentation, Puree spoon;fed by clinician   Order of Presentation 2   Preparatory Phase WFL   Oral Phase, Puree WFL   Pharyngeal Phase, Puree WFL   Rosenbek's Penetration Aspiration Scale: Puree Food Trial Results 1 - no aspiration, contrast does not enter airway   Diagnostic Statement no pharyngeal residue; no penetration or aspiration. WNL with puree texture.   VFSS Eval: Regular Texture Trial (Solid)   Mode of Presentation spoon;fed by clinician   Order of Presentation 3   Preparatory Phase WFL   Oral Phase WFL   Pharyngeal Phase WFL   Rosenbek's Penetration Aspiration Scale 1 - no aspiration, contrast does not enter airway   Diagnostic Statement no pharyngeal residue; no penetration or aspiration. WNL with regular texture.   Educational Assessment   Barriers to Learning No barriers   Preferred Learning Style Listening   Esophageal Phase of Swallow   Patient reports or presents with symptoms of esophageal dysphagia   (no overt difficulties or complaints present)   Esophageal sweep performed during today s vidofluoroscopic exam  Please refer to radiologist's report for details   Esophageal comments see esophagram for detailed report and comment regarding esophageal phase of the swallow   Swallow Eval: Clinical Impressions   Skilled Criteria for Therapy Intervention No problems identified which require skilled intervention  (eval only, no tx indicated)   Functional Assessment Scale (FAS) 7   Dysphagia Outcome Severity Scale (NINA) Level 7 - NINA   Treatment Diagnosis oropharyngeal dysphagia   Diet texture recommendations Regular diet;Thin liquids (level 0)   Anticipated Discharge Disposition home   Risks and Benefits of Treatment have been explained. Yes   Patient, family and/or staff in  agreement with Plan of Care Yes   Clinical Impression Comments Videoswallow study completed per MD order to assess for aspiration risk due to chronic cough. Oropharyngeal mechanism noted to be intact with good strength, coordination and symmetry. Dentition intact.     Under fluoroscopy the patient consumed PO trials of thin liquid, puree and regular texture. With thin liquid he experienced flash penetration when challenged with very large, consecutive swallows of thin consistency. No other penetration and no instances of aspiration were noted across trials. No pharyngeal residue was present. Oropharyngeal swallowing skills judged to be fully intact.     From an oropharyngeal swallowing standpoint he is appropriate for a regular texture diet and thin liquids. SLP will sign off with no further intervention indicated. Esophagram completed following VFSS.  Defer to radiology report for results and detail regarding the esophageal phase of the swallow.   Total Session Time   SLP Eval: VideoFluoroscopic Swallow function Minutes ([id number]) 20   Total Evaluation Time 20

## 2022-11-25 ENCOUNTER — HOSPITAL ENCOUNTER (OUTPATIENT)
Dept: CT IMAGING | Facility: CLINIC | Age: 67
Discharge: HOME | End: 2022-11-25
Attending: INTERNAL MEDICINE | Admitting: INTERNAL MEDICINE
Payer: MEDICARE

## 2022-11-25 DIAGNOSIS — Z90.2 ACQUIRED ABSENCE OF LUNG: ICD-10-CM

## 2022-11-25 PROCEDURE — 71260 CT THORAX DX C+: CPT

## 2022-11-25 PROCEDURE — 250N000011 HC RX IP 250 OP 636: Performed by: INTERNAL MEDICINE

## 2022-11-25 PROCEDURE — 250N000009 HC RX 250: Performed by: INTERNAL MEDICINE

## 2022-11-25 RX ORDER — IOPAMIDOL 755 MG/ML
102 INJECTION, SOLUTION INTRAVASCULAR ONCE
Status: COMPLETED | OUTPATIENT
Start: 2022-11-25 | End: 2022-11-25

## 2022-11-25 RX ADMIN — IOPAMIDOL 102 ML: 755 INJECTION, SOLUTION INTRAVENOUS at 07:57

## 2022-11-25 RX ADMIN — SODIUM CHLORIDE 69 ML: 9 INJECTION, SOLUTION INTRAVENOUS at 07:57

## 2024-10-04 ENCOUNTER — TRANSFERRED RECORDS (OUTPATIENT)
Dept: MULTI SPECIALTY CLINIC | Facility: CLINIC | Age: 69
End: 2024-10-04
Payer: MEDICARE

## 2024-10-04 LAB
CREATININE (EXTERNAL): 1.4 MG/DL (ref 0.8–1.5)
GLUCOSE (EXTERNAL): 129 MG/DL (ref 70–110)
POTASSIUM (EXTERNAL): 4.3 MMOL/L (ref 3.5–5.5)

## 2024-10-15 ENCOUNTER — MEDICAL CORRESPONDENCE (OUTPATIENT)
Dept: HEALTH INFORMATION MANAGEMENT | Facility: CLINIC | Age: 69
End: 2024-10-15
Payer: MEDICARE

## 2024-10-17 NOTE — PROGRESS NOTES
PTA medications updated by Medication Scribe prior to surgery via phone call with patient (last doses completed by Nurse)     Medication history sources: Patient, H&P, and Patient's home med list  In the past week, patient estimated taking medication this percent of the time: Greater than 90%      Significant changes made to the medication list:  Patient reports no longer taking the following meds (med scribe removed from PTA med list): Excedrin Tension      Additional medication history information:   None    Medication reconciliation completed by provider prior to medication history? No    Time spent in this activity: 25 minutes    The information provided in this note is only as accurate as the sources available at the time of update(s)      Prior to Admission medications    Medication Sig Last Dose Taking? Auth Provider Long Term End Date   amLODIPine (NORVASC) 5 MG tablet Take 10 mg by mouth daily (2 x 5 mg = 10 mg)  at am Yes Reported, Patient Yes    amoxicillin (AMOXIL) 500 MG capsule Take 2,000 mg by mouth once as needed (one hour prior to dental appointment 4 x 500 mg = 2000 mg) More than a month at prn Yes Reported, Patient     atorvastatin (LIPITOR) 10 MG tablet Take 1 tablet (10 mg) by mouth daily  at am Yes Trudy King MD Yes    Ergocalciferol (VITAMIN D2 PO) Take 1-2 capsules by mouth daily 10/13/2024 at am Yes Reported, Patient     hydrochlorothiazide (HYDRODIURIL) 12.5 MG tablet Take 12.5 mg by mouth daily  at am Yes Reported, Patient Yes        Medication history completed by: Elaine Bro LPN

## 2024-10-17 NOTE — PROGRESS NOTES
Infection Prevention Progress Note  10/17/2024      Patient Name: Carlos Kingston [id number]  Admit Date: (Not on file)    Infection Status as of 10/17/2024 8:23 AM: MRSA  Isolation Status as of 10/17/2024 8:23 AM: Contact     MDRO Discontinuation  Infection Prevention has reviewed this patient's chart per the MDRO D/C Policy and have taken the following action:    The patient does not qualify for discontinuation as patient does not have the required negative results. When non-qualifying reason(s) no longer apply, please contact Infection Prevention for re-evaluation.      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    Patient requires one consecutive negative cultures from nares prior to continuing discontinuation evaluation. Surveillance culture orders placed, date: NA, please order if admitted.    Contact Precautions ordered for the following MDRO(s): MRSA    If you have any questions, please contact Infection Prevention.    Tom Briseno, Infection Prevention

## 2024-10-21 ENCOUNTER — ANESTHESIA (OUTPATIENT)
Dept: SURGERY | Facility: CLINIC | Age: 69
End: 2024-10-21
Payer: MEDICARE

## 2024-10-21 ENCOUNTER — HOSPITAL ENCOUNTER (OUTPATIENT)
Facility: CLINIC | Age: 69
Discharge: HOME | End: 2024-10-22
Attending: ORTHOPAEDIC SURGERY | Admitting: ORTHOPAEDIC SURGERY
Payer: MEDICARE

## 2024-10-21 ENCOUNTER — APPOINTMENT (OUTPATIENT)
Dept: GENERAL RADIOLOGY | Facility: CLINIC | Age: 69
End: 2024-10-21
Attending: ORTHOPAEDIC SURGERY
Payer: MEDICARE

## 2024-10-21 ENCOUNTER — ANESTHESIA EVENT (OUTPATIENT)
Dept: SURGERY | Facility: CLINIC | Age: 69
End: 2024-10-21
Payer: MEDICARE

## 2024-10-21 DIAGNOSIS — Z96.661 H/O TOTAL ANKLE REPLACEMENT, RIGHT: Primary | ICD-10-CM

## 2024-10-21 PROBLEM — Z96.669 H/O TOTAL ANKLE REPLACEMENT: Status: ACTIVE | Noted: 2024-10-21

## 2024-10-21 PROCEDURE — 27702 RECONSTRUCT ANKLE JOINT: CPT

## 2024-10-21 PROCEDURE — 250N000011 HC RX IP 250 OP 636: Performed by: STUDENT IN AN ORGANIZED HEALTH CARE EDUCATION/TRAINING PROGRAM

## 2024-10-21 PROCEDURE — 250N000011 HC RX IP 250 OP 636: Performed by: ANESTHESIOLOGY

## 2024-10-21 PROCEDURE — 272N000001 HC OR GENERAL SUPPLY STERILE: Performed by: ORTHOPAEDIC SURGERY

## 2024-10-21 PROCEDURE — C1776 JOINT DEVICE (IMPLANTABLE): HCPCS | Performed by: ORTHOPAEDIC SURGERY

## 2024-10-21 PROCEDURE — 250N000025 HC SEVOFLURANE, PER MIN: Performed by: ORTHOPAEDIC SURGERY

## 2024-10-21 PROCEDURE — 250N000013 HC RX MED GY IP 250 OP 250 PS 637: Performed by: STUDENT IN AN ORGANIZED HEALTH CARE EDUCATION/TRAINING PROGRAM

## 2024-10-21 PROCEDURE — 27702 RECONSTRUCT ANKLE JOINT: CPT | Performed by: ANESTHESIOLOGY

## 2024-10-21 PROCEDURE — 370N000017 HC ANESTHESIA TECHNICAL FEE, PER MIN: Performed by: ORTHOPAEDIC SURGERY

## 2024-10-21 PROCEDURE — 999N000141 HC STATISTIC PRE-PROCEDURE NURSING ASSESSMENT: Performed by: ORTHOPAEDIC SURGERY

## 2024-10-21 PROCEDURE — 999N000179 XR SURGERY CARM FLUORO LESS THAN 5 MIN W STILLS

## 2024-10-21 PROCEDURE — 250N000009 HC RX 250: Performed by: ANESTHESIOLOGY

## 2024-10-21 PROCEDURE — 99100 ANES PT EXTEME AGE<1 YR&>70: CPT

## 2024-10-21 PROCEDURE — 250N000009 HC RX 250: Performed by: ORTHOPAEDIC SURGERY

## 2024-10-21 PROCEDURE — 360N000077 HC SURGERY LEVEL 4, PER MIN: Performed by: ORTHOPAEDIC SURGERY

## 2024-10-21 PROCEDURE — 710N000009 HC RECOVERY PHASE 1, LEVEL 1, PER MIN: Performed by: ORTHOPAEDIC SURGERY

## 2024-10-21 PROCEDURE — 87081 CULTURE SCREEN ONLY: CPT | Performed by: INTERNAL MEDICINE

## 2024-10-21 PROCEDURE — 258N000003 HC RX IP 258 OP 636: Performed by: ANESTHESIOLOGY

## 2024-10-21 PROCEDURE — 271N000001 HC OR GENERAL SUPPLY NON-STERILE: Performed by: ORTHOPAEDIC SURGERY

## 2024-10-21 DEVICE — TALAR DOME, SIZE 2, RIGHT
Type: IMPLANTABLE DEVICE | Site: ANKLE | Status: FUNCTIONAL
Brand: SALTO TALARIS®

## 2024-10-21 DEVICE — TIBIAL TRAY, XL, SIZE 2
Type: IMPLANTABLE DEVICE | Site: ANKLE | Status: FUNCTIONAL
Brand: SALTO TALARIS®

## 2024-10-21 DEVICE — SALTO TALARIS. INSERT. SIZE 2.                                    RIGHT. TH8
Type: IMPLANTABLE DEVICE | Site: ANKLE | Status: FUNCTIONAL
Brand: SALTO TALARIS TOTAL ANKLE PROSTHESIS

## 2024-10-21 RX ORDER — NALOXONE HYDROCHLORIDE 0.4 MG/ML
0.4 INJECTION, SOLUTION INTRAMUSCULAR; INTRAVENOUS; SUBCUTANEOUS
Status: DISCONTINUED | OUTPATIENT
Start: 2024-10-21 | End: 2024-10-22 | Stop reason: HOSPADM

## 2024-10-21 RX ORDER — BISACODYL 10 MG
10 SUPPOSITORY, RECTAL RECTAL DAILY PRN
Status: DISCONTINUED | OUTPATIENT
Start: 2024-10-24 | End: 2024-10-22 | Stop reason: HOSPADM

## 2024-10-21 RX ORDER — CEFAZOLIN SODIUM 2 G/100ML
2 INJECTION, SOLUTION INTRAVENOUS EVERY 8 HOURS
Status: COMPLETED | OUTPATIENT
Start: 2024-10-21 | End: 2024-10-22

## 2024-10-21 RX ORDER — ONDANSETRON 4 MG/1
4 TABLET, ORALLY DISINTEGRATING ORAL EVERY 30 MIN PRN
Status: DISCONTINUED | OUTPATIENT
Start: 2024-10-21 | End: 2024-10-21 | Stop reason: HOSPADM

## 2024-10-21 RX ORDER — HYDROMORPHONE HCL IN WATER/PF 6 MG/30 ML
0.4 PATIENT CONTROLLED ANALGESIA SYRINGE INTRAVENOUS EVERY 5 MIN PRN
Status: DISCONTINUED | OUTPATIENT
Start: 2024-10-21 | End: 2024-10-21 | Stop reason: HOSPADM

## 2024-10-21 RX ORDER — AMOXICILLIN 250 MG
1-2 CAPSULE ORAL 2 TIMES DAILY
Qty: 30 TABLET | Refills: 0 | Status: SHIPPED | OUTPATIENT
Start: 2024-10-21

## 2024-10-21 RX ORDER — ASPIRIN 81 MG/1
81 TABLET ORAL 2 TIMES DAILY
Status: DISCONTINUED | OUTPATIENT
Start: 2024-10-21 | End: 2024-10-22 | Stop reason: HOSPADM

## 2024-10-21 RX ORDER — LIDOCAINE 40 MG/G
CREAM TOPICAL
Status: DISCONTINUED | OUTPATIENT
Start: 2024-10-21 | End: 2024-10-22 | Stop reason: HOSPADM

## 2024-10-21 RX ORDER — ONDANSETRON 2 MG/ML
4 INJECTION INTRAMUSCULAR; INTRAVENOUS EVERY 30 MIN PRN
Status: DISCONTINUED | OUTPATIENT
Start: 2024-10-21 | End: 2024-10-21 | Stop reason: HOSPADM

## 2024-10-21 RX ORDER — AMLODIPINE BESYLATE 10 MG/1
10 TABLET ORAL DAILY
Status: DISCONTINUED | OUTPATIENT
Start: 2024-10-22 | End: 2024-10-22 | Stop reason: HOSPADM

## 2024-10-21 RX ORDER — NALOXONE HYDROCHLORIDE 0.4 MG/ML
0.2 INJECTION, SOLUTION INTRAMUSCULAR; INTRAVENOUS; SUBCUTANEOUS
Status: DISCONTINUED | OUTPATIENT
Start: 2024-10-21 | End: 2024-10-22 | Stop reason: HOSPADM

## 2024-10-21 RX ORDER — ONDANSETRON 4 MG/1
4 TABLET, ORALLY DISINTEGRATING ORAL EVERY 8 HOURS PRN
Qty: 10 TABLET | Refills: 0 | Status: SHIPPED | OUTPATIENT
Start: 2024-10-21

## 2024-10-21 RX ORDER — SODIUM CHLORIDE, SODIUM LACTATE, POTASSIUM CHLORIDE, CALCIUM CHLORIDE 600; 310; 30; 20 MG/100ML; MG/100ML; MG/100ML; MG/100ML
INJECTION, SOLUTION INTRAVENOUS CONTINUOUS PRN
Status: DISCONTINUED | OUTPATIENT
Start: 2024-10-21 | End: 2024-10-21

## 2024-10-21 RX ORDER — AMOXICILLIN 250 MG
1 CAPSULE ORAL 2 TIMES DAILY
Status: DISCONTINUED | OUTPATIENT
Start: 2024-10-21 | End: 2024-10-22 | Stop reason: HOSPADM

## 2024-10-21 RX ORDER — POLYETHYLENE GLYCOL 3350 17 G/17G
17 POWDER, FOR SOLUTION ORAL DAILY
Status: DISCONTINUED | OUTPATIENT
Start: 2024-10-22 | End: 2024-10-22 | Stop reason: HOSPADM

## 2024-10-21 RX ORDER — ONDANSETRON 2 MG/ML
INJECTION INTRAMUSCULAR; INTRAVENOUS PRN
Status: DISCONTINUED | OUTPATIENT
Start: 2024-10-21 | End: 2024-10-21

## 2024-10-21 RX ORDER — ACETAMINOPHEN 325 MG/1
650 TABLET ORAL EVERY 4 HOURS PRN
Status: DISCONTINUED | OUTPATIENT
Start: 2024-10-24 | End: 2024-10-22 | Stop reason: HOSPADM

## 2024-10-21 RX ORDER — HYDROMORPHONE HCL IN WATER/PF 6 MG/30 ML
0.2 PATIENT CONTROLLED ANALGESIA SYRINGE INTRAVENOUS
Status: DISCONTINUED | OUTPATIENT
Start: 2024-10-21 | End: 2024-10-22 | Stop reason: HOSPADM

## 2024-10-21 RX ORDER — FENTANYL CITRATE 50 UG/ML
INJECTION, SOLUTION INTRAMUSCULAR; INTRAVENOUS PRN
Status: DISCONTINUED | OUTPATIENT
Start: 2024-10-21 | End: 2024-10-21

## 2024-10-21 RX ORDER — FENTANYL CITRATE 0.05 MG/ML
25 INJECTION, SOLUTION INTRAMUSCULAR; INTRAVENOUS EVERY 5 MIN PRN
Status: DISCONTINUED | OUTPATIENT
Start: 2024-10-21 | End: 2024-10-21 | Stop reason: HOSPADM

## 2024-10-21 RX ORDER — ONDANSETRON 4 MG/1
4 TABLET, ORALLY DISINTEGRATING ORAL EVERY 6 HOURS PRN
Status: DISCONTINUED | OUTPATIENT
Start: 2024-10-21 | End: 2024-10-22 | Stop reason: HOSPADM

## 2024-10-21 RX ORDER — HYDROCHLOROTHIAZIDE 12.5 MG/1
12.5 TABLET ORAL DAILY
Status: DISCONTINUED | OUTPATIENT
Start: 2024-10-21 | End: 2024-10-22 | Stop reason: HOSPADM

## 2024-10-21 RX ORDER — ASPIRIN 81 MG/1
81 TABLET ORAL 2 TIMES DAILY
Qty: 60 TABLET | Refills: 0 | Status: SHIPPED | OUTPATIENT
Start: 2024-10-21

## 2024-10-21 RX ORDER — ONDANSETRON 2 MG/ML
4 INJECTION INTRAMUSCULAR; INTRAVENOUS EVERY 6 HOURS PRN
Status: DISCONTINUED | OUTPATIENT
Start: 2024-10-21 | End: 2024-10-22 | Stop reason: HOSPADM

## 2024-10-21 RX ORDER — CEFAZOLIN SODIUM/WATER 2 G/20 ML
2 SYRINGE (ML) INTRAVENOUS SEE ADMIN INSTRUCTIONS
Status: DISCONTINUED | OUTPATIENT
Start: 2024-10-21 | End: 2024-10-21 | Stop reason: HOSPADM

## 2024-10-21 RX ORDER — OXYCODONE HYDROCHLORIDE 5 MG/1
10 TABLET ORAL EVERY 4 HOURS PRN
Status: DISCONTINUED | OUTPATIENT
Start: 2024-10-21 | End: 2024-10-22 | Stop reason: HOSPADM

## 2024-10-21 RX ORDER — ATORVASTATIN CALCIUM 10 MG/1
10 TABLET, FILM COATED ORAL DAILY
Status: DISCONTINUED | OUTPATIENT
Start: 2024-10-22 | End: 2024-10-22 | Stop reason: HOSPADM

## 2024-10-21 RX ORDER — ACETAMINOPHEN 325 MG/1
650 TABLET ORAL EVERY 4 HOURS PRN
Qty: 100 TABLET | Refills: 0 | Status: SHIPPED | OUTPATIENT
Start: 2024-10-21

## 2024-10-21 RX ORDER — ACETAMINOPHEN 325 MG/1
975 TABLET ORAL EVERY 8 HOURS
Status: DISCONTINUED | OUTPATIENT
Start: 2024-10-21 | End: 2024-10-22 | Stop reason: HOSPADM

## 2024-10-21 RX ORDER — CEFAZOLIN SODIUM/WATER 2 G/20 ML
2 SYRINGE (ML) INTRAVENOUS
Status: COMPLETED | OUTPATIENT
Start: 2024-10-21 | End: 2024-10-21

## 2024-10-21 RX ORDER — HYDROMORPHONE HCL IN WATER/PF 6 MG/30 ML
0.2 PATIENT CONTROLLED ANALGESIA SYRINGE INTRAVENOUS EVERY 5 MIN PRN
Status: DISCONTINUED | OUTPATIENT
Start: 2024-10-21 | End: 2024-10-21 | Stop reason: HOSPADM

## 2024-10-21 RX ORDER — MAGNESIUM HYDROXIDE 1200 MG/15ML
LIQUID ORAL PRN
Status: DISCONTINUED | OUTPATIENT
Start: 2024-10-21 | End: 2024-10-21 | Stop reason: HOSPADM

## 2024-10-21 RX ORDER — LIDOCAINE HYDROCHLORIDE 20 MG/ML
INJECTION, SOLUTION INFILTRATION; PERINEURAL PRN
Status: DISCONTINUED | OUTPATIENT
Start: 2024-10-21 | End: 2024-10-21

## 2024-10-21 RX ORDER — OXYCODONE HYDROCHLORIDE 5 MG/1
5 TABLET ORAL EVERY 4 HOURS PRN
Status: DISCONTINUED | OUTPATIENT
Start: 2024-10-21 | End: 2024-10-22 | Stop reason: HOSPADM

## 2024-10-21 RX ORDER — FENTANYL CITRATE 0.05 MG/ML
50 INJECTION, SOLUTION INTRAMUSCULAR; INTRAVENOUS EVERY 5 MIN PRN
Status: DISCONTINUED | OUTPATIENT
Start: 2024-10-21 | End: 2024-10-21 | Stop reason: HOSPADM

## 2024-10-21 RX ORDER — PROPOFOL 10 MG/ML
INJECTION, EMULSION INTRAVENOUS PRN
Status: DISCONTINUED | OUTPATIENT
Start: 2024-10-21 | End: 2024-10-21

## 2024-10-21 RX ORDER — DEXAMETHASONE SODIUM PHOSPHATE 4 MG/ML
INJECTION, SOLUTION INTRA-ARTICULAR; INTRALESIONAL; INTRAMUSCULAR; INTRAVENOUS; SOFT TISSUE PRN
Status: DISCONTINUED | OUTPATIENT
Start: 2024-10-21 | End: 2024-10-21

## 2024-10-21 RX ORDER — HYDROMORPHONE HYDROCHLORIDE 1 MG/ML
0.5 INJECTION, SOLUTION INTRAMUSCULAR; INTRAVENOUS; SUBCUTANEOUS
Status: DISCONTINUED | OUTPATIENT
Start: 2024-10-21 | End: 2024-10-22 | Stop reason: HOSPADM

## 2024-10-21 RX ORDER — NALOXONE HYDROCHLORIDE 0.4 MG/ML
0.1 INJECTION, SOLUTION INTRAMUSCULAR; INTRAVENOUS; SUBCUTANEOUS
Status: DISCONTINUED | OUTPATIENT
Start: 2024-10-21 | End: 2024-10-21 | Stop reason: HOSPADM

## 2024-10-21 RX ORDER — DEXAMETHASONE SODIUM PHOSPHATE 4 MG/ML
4 INJECTION, SOLUTION INTRA-ARTICULAR; INTRALESIONAL; INTRAMUSCULAR; INTRAVENOUS; SOFT TISSUE
Status: DISCONTINUED | OUTPATIENT
Start: 2024-10-21 | End: 2024-10-21 | Stop reason: HOSPADM

## 2024-10-21 RX ORDER — OXYCODONE HYDROCHLORIDE 5 MG/1
5-10 TABLET ORAL EVERY 4 HOURS PRN
Qty: 26 TABLET | Refills: 0 | Status: SHIPPED | OUTPATIENT
Start: 2024-10-21

## 2024-10-21 RX ORDER — PROCHLORPERAZINE MALEATE 5 MG/1
5 TABLET ORAL EVERY 6 HOURS PRN
Status: DISCONTINUED | OUTPATIENT
Start: 2024-10-21 | End: 2024-10-22 | Stop reason: HOSPADM

## 2024-10-21 RX ORDER — HYDROXYZINE HYDROCHLORIDE 10 MG/1
10 TABLET, FILM COATED ORAL EVERY 6 HOURS PRN
Qty: 30 TABLET | Refills: 0 | Status: SHIPPED | OUTPATIENT
Start: 2024-10-21

## 2024-10-21 RX ORDER — HALOPERIDOL 5 MG/ML
1 INJECTION INTRAMUSCULAR
Status: DISCONTINUED | OUTPATIENT
Start: 2024-10-21 | End: 2024-10-21 | Stop reason: HOSPADM

## 2024-10-21 RX ADMIN — Medication 2 G: at 07:28

## 2024-10-21 RX ADMIN — FENTANYL CITRATE 50 MCG: 50 INJECTION INTRAMUSCULAR; INTRAVENOUS at 07:38

## 2024-10-21 RX ADMIN — DEXAMETHASONE SODIUM PHOSPHATE 10 MG: 4 INJECTION, SOLUTION INTRA-ARTICULAR; INTRALESIONAL; INTRAMUSCULAR; INTRAVENOUS; SOFT TISSUE at 07:36

## 2024-10-21 RX ADMIN — ONDANSETRON 4 MG: 2 INJECTION INTRAMUSCULAR; INTRAVENOUS at 07:40

## 2024-10-21 RX ADMIN — ASPIRIN 81 MG: 81 TABLET, COATED ORAL at 13:16

## 2024-10-21 RX ADMIN — LIDOCAINE HYDROCHLORIDE 100 MG: 20 INJECTION, SOLUTION INFILTRATION; PERINEURAL at 07:32

## 2024-10-21 RX ADMIN — SENNOSIDES AND DOCUSATE SODIUM 1 TABLET: 50; 8.6 TABLET ORAL at 21:11

## 2024-10-21 RX ADMIN — SODIUM CHLORIDE, POTASSIUM CHLORIDE, SODIUM LACTATE AND CALCIUM CHLORIDE: 600; 310; 30; 20 INJECTION, SOLUTION INTRAVENOUS at 07:28

## 2024-10-21 RX ADMIN — BUPIVACAINE HYDROCHLORIDE 25 ML: 5 INJECTION, SOLUTION EPIDURAL; INTRACAUDAL at 06:58

## 2024-10-21 RX ADMIN — FENTANYL CITRATE 50 MCG: 50 INJECTION INTRAMUSCULAR; INTRAVENOUS at 07:32

## 2024-10-21 RX ADMIN — PROPOFOL 200 MG: 10 INJECTION, EMULSION INTRAVENOUS at 07:32

## 2024-10-21 RX ADMIN — ASPIRIN 81 MG: 81 TABLET, COATED ORAL at 21:11

## 2024-10-21 RX ADMIN — CEFAZOLIN SODIUM 2 G: 2 INJECTION, SOLUTION INTRAVENOUS at 23:40

## 2024-10-21 RX ADMIN — MIDAZOLAM 2 MG: 1 INJECTION INTRAMUSCULAR; INTRAVENOUS at 06:58

## 2024-10-21 RX ADMIN — SENNOSIDES AND DOCUSATE SODIUM 1 TABLET: 50; 8.6 TABLET ORAL at 13:15

## 2024-10-21 RX ADMIN — CEFAZOLIN SODIUM 2 G: 2 INJECTION, SOLUTION INTRAVENOUS at 16:13

## 2024-10-21 RX ADMIN — ACETAMINOPHEN 975 MG: 325 TABLET, FILM COATED ORAL at 13:15

## 2024-10-21 RX ADMIN — HYDROCHLOROTHIAZIDE 12.5 MG: 12.5 TABLET ORAL at 13:16

## 2024-10-21 RX ADMIN — ACETAMINOPHEN 975 MG: 325 TABLET, FILM COATED ORAL at 18:48

## 2024-10-21 ASSESSMENT — ACTIVITIES OF DAILY LIVING (ADL)
ADLS_ACUITY_SCORE: 37
ADLS_ACUITY_SCORE: 38
ADLS_ACUITY_SCORE: 40
ADLS_ACUITY_SCORE: 37
ADLS_ACUITY_SCORE: 40
ADLS_ACUITY_SCORE: 37
ADLS_ACUITY_SCORE: 38
ADLS_ACUITY_SCORE: 37
ADLS_ACUITY_SCORE: 40
ADLS_ACUITY_SCORE: 38
ADLS_ACUITY_SCORE: 40
ADLS_ACUITY_SCORE: 37
ADLS_ACUITY_SCORE: 40
ADLS_ACUITY_SCORE: 40
ADLS_ACUITY_SCORE: 38
ADLS_ACUITY_SCORE: 37
ADLS_ACUITY_SCORE: 40

## 2024-10-21 ASSESSMENT — ENCOUNTER SYMPTOMS
DYSRHYTHMIAS: 0
SEIZURES: 0

## 2024-10-21 ASSESSMENT — LIFESTYLE VARIABLES: TOBACCO_USE: 0

## 2024-10-21 NOTE — ANESTHESIA POSTPROCEDURE EVALUATION
Patient: Carlos Kingston    Procedure: Procedure(s):  RIGHT TOTAL ANKLE ARTHROPLASTY       Anesthesia Type:  General    Note:  Disposition: Admission   Postop Pain Control: Uneventful            Sign Out: Well controlled pain   PONV: No   Neuro/Psych: Uneventful            Sign Out: Acceptable/Baseline neuro status   Airway/Respiratory: Uneventful            Sign Out: Acceptable/Baseline resp. status   CV/Hemodynamics: Uneventful            Sign Out: Acceptable CV status; No obvious hypovolemia; No obvious fluid overload   Other NRE: NONE   DID A NON-ROUTINE EVENT OCCUR? No           Last vitals:  Vitals Value Taken Time   /73 10/21/24 0915   Temp 36.8  C (98.2  F) 10/21/24 0915   Pulse 61 10/21/24 0915   Resp 24 10/21/24 0915   SpO2 95 % 10/21/24 0916   Vitals shown include unfiled device data.    Electronically Signed By: Emy De Los Santos MD  October 21, 2024  10:04 AM

## 2024-10-21 NOTE — ANESTHESIA PROCEDURE NOTES
"Sciatic Procedure Note    Pre-Procedure   Staff -        Anesthesiologist:  Emy De Los Santos MD       Performed By: anesthesiologist       Location: pre-op       Pre-Anesthestic Checklist: patient identified, IV checked, site marked, risks and benefits discussed, informed consent, monitors and equipment checked, pre-op evaluation, at physician/surgeon's request and post-op pain management  Timeout:       Correct Patient: Yes        Correct Procedure: Yes        Correct Site: Yes        Correct Position: Yes        Correct Laterality: Yes        Site Marked: Yes  Procedure Documentation  Procedure: Sciatic       Diagnosis: ARTHRITIS       Laterality: right       Patient Position: supine       Patient Prep/Sterile Barriers: mask       Skin prep: Chloraprep       Local skin infiltrated with 2 mL of 1% lidocaine.  (popliteal approach).       Needle Type: short bevel       Needle Gauge: 21.        Needle Length (Inches): 3.13        Ultrasound guided       1. Ultrasound was used to identify targeted nerve, plexus, vascular marker, or fascial plane and place a needle adjacent to it in real-time.       2. Ultrasound was used to visualize the spread of anesthetic in close proximity to the above referenced structure.       3. A permanent image is entered into the patient's record.       4. The visualized anatomic structures appeared normal.       5. There were no apparent abnormal pathologic findings.    Assessment/Narrative         The placement was negative for: blood aspirated, painful injection and site bleeding       Paresthesias: No.       Bolus given via needle..        Secured via.        Insertion/Infusion Method: Single Shot       Complications: none    Medication(s) Administered   Bupivacaine 0.5% w/ 1:400K Epi (Injection) - Injection   25 mL - 10/21/2024 6:58:00 AM    FOR [facility] ([facility] [place]) ONLY:   Pain Team Contact information: please page the Pain Team Via [employer]. Search \"Pain\". During daytime hours, " please page the attending first. At night please page the resident first.

## 2024-10-21 NOTE — ANESTHESIA PROCEDURE NOTES
Airway       Patient location during procedure: OR       Procedure Start/Stop Times: 10/21/2024 7:34 AM  Staff -        CRNA: Mirna Montesinos APRN CRNA       Performed By: CRNA  Consent for Airway        Urgency: elective  Indications and Patient Condition       Indications for airway management: gui-procedural       Induction type:intravenous       Mask difficulty assessment: 0 - not attempted    Final Airway Details       Final airway type: supraglottic airway    Supraglottic Airway Details        Type: LMA       Brand: Ambu AuraGain       LMA size: 5    Post intubation assessment        Placement verified by: capnometry, equal breath sounds and chest rise        Number of attempts at approach: 1       Number of other approaches attempted: 0       Secured with: tape       Ease of procedure: easy       Dentition: Intact and Unchanged    Medication(s) Administered   Medication Administration Time: 10/21/2024 7:34 AM

## 2024-10-21 NOTE — ANESTHESIA PROCEDURE NOTES
"Saphenous Procedure Note    Pre-Procedure   Staff -        Anesthesiologist:  Emy De Los Santos MD       Performed By: anesthesiologist       Location: pre-op       Pre-Anesthestic Checklist: patient identified, IV checked, site marked, risks and benefits discussed, informed consent, monitors and equipment checked, pre-op evaluation, at physician/surgeon's request and post-op pain management  Timeout:       Correct Patient: Yes        Correct Procedure: Yes        Correct Site: Yes        Correct Position: Yes        Correct Laterality: Yes        Site Marked: Yes  Procedure Documentation  Procedure: Saphenous       Diagnosis: ARTHRITIS       Laterality: right       Patient Position: supine       Patient Prep/Sterile Barriers: mask       Skin prep: Chloraprep       Local skin infiltrated with 2 mL of 1% lidocaine.        Needle Type: short bevel       Needle Gauge: 21.        Needle Length (Inches): 3.13        Ultrasound guided       1. Ultrasound was used to identify targeted nerve, plexus, vascular marker, or fascial plane and place a needle adjacent to it in real-time.       2. Ultrasound was used to visualize the spread of anesthetic in close proximity to the above referenced structure.       3. A permanent image is entered into the patient's record.       4. The visualized anatomic structures appeared normal.       5. There were no apparent abnormal pathologic findings.    Assessment/Narrative         The placement was negative for: blood aspirated, painful injection and site bleeding       Paresthesias: No.       Bolus given via needle..        Secured via.        Insertion/Infusion Method: Single Shot       Complications: none    Medication(s) Administered   Bupivacaine 0.5% w/ 1:400K Epi (Injection) - Injection   15 mL - 10/21/2024 7:01:00 AM    FOR [facility] ([facility] [place]) ONLY:   Pain Team Contact information: please page the Pain Team Via [employer]. Search \"Pain\". During daytime hours, please page the " attending first. At night please page the resident first.

## 2024-10-21 NOTE — OP NOTE
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    Postoperative diagnosis:  The same.    Surgical procedure:  Right total ankle replacement using a Luisito Talaris size 2 long base tibia, size 2 talus and 8 mm polyethylene spacer.    Anesthetic:  General And popliteal block.    Surgeon: Rylie Shepard MD  Assistant: Favian Aquino PA-C    Need for an assistant:  A skilled first assistant was needed for the entire case to help with positioning, retracting, positioning of cutting blocks, wound closure and splint application.    Preamble:  Mr. Kingston presents with severe degenerative changes of his right ankle.  He tried conservative management to no avail.  He previously had arthroscopic debridements of his ankle but continues to be increasingly symptomatic.    Informed consent was obtained for above-mentioned procedure.    Description of procedure:  After adequate induction of a general anesthetic the patient was positioned supine on the operating table.  The right leg was sterilely prepped and free draped in usual fashion.  Tourniquet around the thigh was inflated to 300 mmHg.     A standard 15 cm midline incision was used anterior over the ankle.  The interval between the extensor hallucis longus and tibialis anterior was used to expose the joint.  The external guide was then used to do the distal tibial cut 9 mm from the articular surface.  The bone was removed.    A central talar pin was then placed and the chamfer cuts done for the talus.  With all the cuts made the ankle measured to a size 2 long base tibia, size 2 talus and there was good stability and range of motion with the 8 mm polyethylene spacer.    The final keel cuts were done through the trial components.    The components were removed.  The ankle was thoroughly rinsed.  The definitive components were then impacted in place again with excellent stability and range of motion.    The tourniquet was deflated.   The stasis obtained.  The wounds closed in layers.  A sterile dressing, light compressive bandage and a short leg cast applied.  He tolerated the procedure well and was taken to the recovery room in satisfactory condition.  He can ambulate partial weightbearing with crutches.  Sutures will be removed in 2 weeks.

## 2024-10-21 NOTE — ANESTHESIA CARE TRANSFER NOTE
Patient: Carlos Kingston    Procedure: Procedure(s):  RIGHT TOTAL ANKLE ARTHROPLASTY       Diagnosis: Arthritis of right ankle [M19.071]  Diagnosis Additional Information: No value filed.    Anesthesia Type:   General     Note:    Oropharynx: oropharynx clear of all foreign objects and spontaneously breathing  Level of Consciousness: drowsy    Level of Supplemental Oxygen (L/min / FiO2): 6  Independent Airway: airway patency satisfactory and stable  Dentition: dentition unchanged  Vital Signs Stable: post-procedure vital signs reviewed and stable  Report to RN Given: handoff report given  Patient transferred to: Phase II    Handoff Report: Identifed the Patient, Identified the Reponsible Provider, Reviewed the pertinent medical history, Discussed the surgical course, Reviewed Intra-OP anesthesia mangement and issues during anesthesia, Set expectations for post-procedure period and Allowed opportunity for questions and acknowledgement of understanding      Vitals:  Vitals Value Taken Time   /77 10/21/24 0818   Temp     Pulse 82 10/21/24 0820   Resp 10 10/21/24 0820   SpO2 96 % 10/21/24 0820   Vitals shown include unfiled device data.    Electronically Signed By: ANNETTE Díaz CRNA  October 21, 2024  8:21 AM

## 2024-10-21 NOTE — ANESTHESIA PREPROCEDURE EVALUATION
Anesthesia Pre-Procedure Evaluation    Patient: Carlos Kingston   MRN: [id number] : 1955        Procedure : Procedure(s):  RIGHT TOTAL ANKLE ARTHROPLASTY          Past Medical History:   Diagnosis Date    Acute kidney failure, unspecified (H)     Anterior subcapsular polar age-related cataract of left eye     Arthritis     Bilateral knees    Cancer (H)     Cataract     Daytime somnolence     Ganglion cyst of finger     Herniation of right lung     History of dermatophytosis     History of eye trauma     History of prostatitis     Hyperlipidemia     Hypertension     Knee joint replacement status     Lumbar spinal stenosis     Male erectile disorder     Methicillin resistant Staphylococcus aureus culture positive     Migraines     KASEY (obstructive sleep apnea)     Osteoarthrosis     Pneumonia of right middle lobe due to infectious organism     Synovial cyst of lumbar facet joint     T wave inversion in EKG     Tubular adenoma of colon     Vitamin D deficiency     Weakness of left lower extremity       Past Surgical History:   Procedure Laterality Date    ANKLE ARTHROSCOPY Right     ANKLE SURGERY Right     Debridement    ARTHROPLASTY HIP Right 2014    Procedure: ARTHROPLASTY HIP;  Surgeon: Elliott Gonzalez MD;  Location: [facility] OR    ARTHROPLASTY KNEE BILATERAL Bilateral 2019    Procedure: 1.  Left total knee arthroplasty.  2.  Right total knee arthroplasty.;  Surgeon: Elliott Gonzalez MD;  Location: [facility] OR    ARTHROSCOPY KNEE Right     ARTHROSCOPY KNEE WITH MEDIAL MENISCECTOMY  2013    Procedure: ARTHROSCOPY KNEE WITH MEDIAL MENISCECTOMY;  RIGHT ARTHROSCOPY KNEE WITH MEDIAL MENISCECTOMY;  Surgeon: Elliott Gonzalez MD;  Location:  OR    BRONCHOSCOPY FLEXIBLE N/A 2022    Procedure: FLEXIBLE BRONCHOSCOPY;  Surgeon: Steven Reveles MD;  Location:  OR    CARPAL TUNNEL RELEASE Bilateral     COLONOSCOPY      X2    DISCECTOMY, FUSION CERVICAL ANTERIOR TWO LEVELS, COMBINED N/A  12/29/2014    Procedure: COMBINED DISCECTOMY, FUSION CERVICAL ANTERIOR TWO LEVELS;  Surgeon: Mainor Adan MD;  Location: [facility] OR    FATTY LUMP REMOVAL FROM ARM Right     IMPLANT PROSTHESIS PENIS INFLATABLE N/A 08/04/2020    Procedure: PLACEMENT OF COLOPLAST PENILE PROSTHESIS;  Surgeon: Yaron Shetty MD;  Location: [facility] OR    LOBECTOMY LUNG Right 07/01/2022    Procedure: RIGHT MIDDLE LOBECTOMY;  Surgeon: Steven Reveles MD;  Location: [facility] OR    THORACOTOMY Right 07/01/2022    Procedure: RIGHT THORACOTOMY;  Surgeon: Steven Reveles MD;  Location: [facility] OR    TOTAL HIP ARTHROPLASTY Left     VASECTOMY        No Known Allergies   Social History     Tobacco Use    Smoking status: Never    Smokeless tobacco: Never   Substance Use Topics    Alcohol use: Yes     Comment: SOCIAL      Wt Readings from Last 1 Encounters:   10/21/24 71.7 kg (158 lb)        Anesthesia Evaluation   Pt has had prior anesthetic. Type: General (Patient reported one episode of nausea one time with NITHYA).    No history of anesthetic complications       ROS/MED HX  ENT/Pulmonary: Comment: Chronic cough s/p aspiration and middle lobectomy - never smoker    (+) sleep apnea,                                    (-) tobacco use, asthma and recent URI   Neurologic:     (+)      migraines,                       (-) no seizures, no CVA and no TIA   Cardiovascular:     (+) Dyslipidemia hypertension- -   -  - -                                   (-) CAD, [place], arrhythmias and valvular problems/murmurs   METS/Exercise Tolerance:     Hematologic:    (-) history of blood clots and anemia   Musculoskeletal:   (+)  arthritis (s/p NITHYA),             GI/Hepatic:    (-) GERD and liver disease   Renal/Genitourinary:     (+) renal disease,          (-) nephrolithiasis   Endo:    (-) Type I DM, Type II DM, thyroid disease and obesity   Psychiatric/Substance Use:    (-) psychiatric history   Infectious Disease:    (-) Recent Fever   Malignancy:       Other:       (+)  , H/O Chronic Pain (Ankle. No narcotics.),         Physical Exam    Airway        Mallampati: II   TM distance: > 3 FB   Neck ROM: full   Mouth opening: > 3 cm    Respiratory Devices and Support         Dental       (+) Minor Abnormalities - some fillings, tiny chips      Cardiovascular          Rhythm and rate: regular and normal     Pulmonary           breath sounds clear to auscultation           OUTSIDE LABS:  CBC:   Lab Results   Component Value Date    WBC 7.4 07/01/2022    WBC 9.5 06/27/2022    HGB 12.8 (L) 07/02/2022    HGB 14.2 07/01/2022    HCT 42.6 07/01/2022    HCT 43.1 06/27/2022     07/04/2022     07/01/2022     BMP:   Lab Results   Component Value Date     07/01/2022     07/01/2022    POTASSIUM 4.0 08/29/2022    POTASSIUM 4.2 07/01/2022    CHLORIDE 109 07/01/2022    CHLORIDE 111 (H) 07/01/2022    CO2 23 07/01/2022    CO2 24 07/01/2022    BUN 21 07/01/2022    BUN 20 07/01/2022    CR 1.08 07/01/2022    CR 1.11 07/01/2022     (H) 07/03/2022     (H) 07/02/2022     COAGS:   Lab Results   Component Value Date    INR 0.99 07/01/2022     POC:   Lab Results   Component Value Date     (H) 08/04/2020     HEPATIC:   Lab Results   Component Value Date    ALBUMIN 4.2 08/18/2016    PROTTOTAL 6.7 (L) 08/18/2016    ALT 44 08/18/2016    AST 25 08/18/2016    ALKPHOS 63 08/18/2016    BILITOTAL 0.8 08/18/2016     OTHER:   Lab Results   Component Value Date    VADIM 8.7 07/01/2022    CRP <5.0 03/29/2012    SED 4 03/29/2012       Anesthesia Plan    ASA Status:  2    NPO Status:  NPO Appropriate    Anesthesia Type: General.     - Airway: LMA   Induction: Intravenous, Propofol.   Maintenance: Balanced.        Consents    Anesthesia Plan(s) and associated risks, benefits, and realistic alternatives discussed. Questions answered and patient/representative(s) expressed understanding.     - Discussed: Risks, Benefits and Alternatives for the PROCEDURE were discussed     -  "Discussed with:  Patient, Spouse            Postoperative Care    Pain management: IV analgesics, Oral pain medications, Multi-modal analgesia, Peripheral nerve block (Single Shot).   PONV prophylaxis: Ondansetron (or other 5HT-3), Dexamethasone or Solumedrol, Background Propofol Infusion     Comments:               Emy De Los Santos MD    I have reviewed the pertinent notes and labs in the chart from the past 30 days and (re)examined the patient.  Any updates or changes from those notes are reflected in this note.                       # Overweight: Estimated body mass index is 27.12 kg/m  as calculated from the following:    Height as of this encounter: 1.626 m (5' 4\").    Weight as of this encounter: 71.7 kg (158 lb).             "

## 2024-10-22 ENCOUNTER — APPOINTMENT (OUTPATIENT)
Dept: PHYSICAL THERAPY | Facility: CLINIC | Age: 69
End: 2024-10-22
Attending: STUDENT IN AN ORGANIZED HEALTH CARE EDUCATION/TRAINING PROGRAM
Payer: MEDICARE

## 2024-10-22 VITALS
RESPIRATION RATE: 18 BRPM | WEIGHT: 158 LBS | SYSTOLIC BLOOD PRESSURE: 120 MMHG | OXYGEN SATURATION: 93 % | TEMPERATURE: 98.3 F | HEIGHT: 64 IN | DIASTOLIC BLOOD PRESSURE: 71 MMHG | HEART RATE: 69 BPM | BODY MASS INDEX: 26.98 KG/M2

## 2024-10-22 LAB — HGB BLD-MCNC: 12.4 G/DL (ref 13.3–17.7)

## 2024-10-22 PROCEDURE — 250N000013 HC RX MED GY IP 250 OP 250 PS 637: Performed by: STUDENT IN AN ORGANIZED HEALTH CARE EDUCATION/TRAINING PROGRAM

## 2024-10-22 PROCEDURE — 85018 HEMOGLOBIN: CPT | Performed by: STUDENT IN AN ORGANIZED HEALTH CARE EDUCATION/TRAINING PROGRAM

## 2024-10-22 PROCEDURE — 36415 COLL VENOUS BLD VENIPUNCTURE: CPT | Performed by: ORTHOPAEDIC SURGERY

## 2024-10-22 PROCEDURE — 97116 GAIT TRAINING THERAPY: CPT | Mod: GP

## 2024-10-22 PROCEDURE — 97530 THERAPEUTIC ACTIVITIES: CPT | Mod: GP

## 2024-10-22 PROCEDURE — 97161 PT EVAL LOW COMPLEX 20 MIN: CPT | Mod: GP

## 2024-10-22 RX ADMIN — POLYETHYLENE GLYCOL 3350 17 G: 17 POWDER, FOR SOLUTION ORAL at 08:21

## 2024-10-22 RX ADMIN — OXYCODONE HYDROCHLORIDE 10 MG: 5 TABLET ORAL at 10:47

## 2024-10-22 RX ADMIN — AMLODIPINE BESYLATE 10 MG: 10 TABLET ORAL at 08:14

## 2024-10-22 RX ADMIN — HYDROCHLOROTHIAZIDE 12.5 MG: 12.5 TABLET ORAL at 08:14

## 2024-10-22 RX ADMIN — SENNOSIDES AND DOCUSATE SODIUM 1 TABLET: 50; 8.6 TABLET ORAL at 08:14

## 2024-10-22 RX ADMIN — ATORVASTATIN CALCIUM 10 MG: 10 TABLET, FILM COATED ORAL at 08:14

## 2024-10-22 RX ADMIN — ASPIRIN 81 MG: 81 TABLET, COATED ORAL at 08:15

## 2024-10-22 RX ADMIN — ACETAMINOPHEN 975 MG: 325 TABLET, FILM COATED ORAL at 02:27

## 2024-10-22 RX ADMIN — OXYCODONE HYDROCHLORIDE 10 MG: 5 TABLET ORAL at 06:45

## 2024-10-22 RX ADMIN — ACETAMINOPHEN 975 MG: 325 TABLET, FILM COATED ORAL at 10:04

## 2024-10-22 ASSESSMENT — ACTIVITIES OF DAILY LIVING (ADL)
ADLS_ACUITY_SCORE: 40
ADLS_ACUITY_SCORE: 41
ADLS_ACUITY_SCORE: 40
ADLS_ACUITY_SCORE: 41
ADLS_ACUITY_SCORE: 40
ADLS_ACUITY_SCORE: 42

## 2024-10-22 NOTE — PROGRESS NOTES
Progress note:     Patient is s/p right total ankle, POD #1. They are doing well overnight. Pain has been well managed.     PE: Alert and oriented x3. No acute distress. Splint intact with no breakthrough drainage. Capillary refill <3 seconds. No popliteal tenderness or pain.     Plan: Work with PT this morning on 50% weight bear status. Pending success with WB status and pain continued to be managed with oral medications, will discharge home this afternoon.

## 2024-10-22 NOTE — PLAN OF CARE
Goal Outcome Evaluation:    Patient vital signs are at baseline: Yes  Patient able to ambulate as they were prior to admission or with assist devices provided by therapies during their stay:  Yes, SBA gb&w  Patient MUST void prior to discharge:  Yes  Patient able to tolerate oral intake:  Yes  Pain has adequate pain control using Oral analgesics:  Yes  Does patient have an identified :  Yes  Has goal D/C date and time been discussed with patient:  Yes    Pain controlled with scheduled tylenol. CMS in tact. R ankle ACE wrapped, CDI.

## 2024-10-22 NOTE — DISCHARGE SUMMARY
Patient vital signs are at baseline: Yes  Patient able to ambulate as they were prior to admission or with assist devices provided by therapies during their stay:  Yes  Patient MUST void prior to discharge:  Yes  Patient able to tolerate oral intake:  Yes  Pain has adequate pain control using Oral analgesics:  Yes  Does patient have an identified :  Yes  Has goal D/C date and time been discussed with patient:  Yes    Patient POD1 from R Total Aknle Fusion, Ace wrapped , DCI. Pain managed with PRN and schedule medications. Pt discharging home via family. Instructions reviweth with teach back, copy and medications handed to Pt. Belongs returned to Pt.

## 2024-10-22 NOTE — PROGRESS NOTES
10/22/24 1000   Appointment Info   Signing Clinician's Name / Credentials (PT) EDGARDO Tenorio   Student Supervision On-site supervision provided;Direct supervision provided;Line of sight supervision provided;Direct Patient Contact Provided;Therapy services provided with the co-signing licensed therapist guiding and directing the services, and providing the skilled judgement and assessment throughout the session  (Raul Dominguez DPT)   Rehab Comments (PT) 50% WB, flat foot WB   Quick Adds   Quick Adds Certification   Living Environment   People in Home spouse   Current Living Arrangements   (Lives in [facility])   Home Accessibility stairs to enter home   Number of Stairs, Main Entrance 2   Stair Railings, Main Entrance none   Transportation Anticipated car, drives self;family or friend will provide   Living Environment Comments Bedroom and bathroom on main floor. There is a full flight of stairs inside [facility].   Self-Care   Usual Activity Tolerance good   Current Activity Tolerance fair   Regular Exercise Yes   Activity/Exercise Type walking   Equipment Currently Used at Home   (Has FWW and knee scooter. Was not using a device previous to surgery.)   Fall history within last six months no   Activity/Exercise/Self-Care Comment Reports could walk to the end of a block and back. Had to stop d/t pain   General Information   Onset of Illness/Injury or Date of Surgery 10/21/24   Referring Physician Favian Aquino PA-C   Patient/Family Therapy Goals Statement (PT) Return home   Pertinent History of Current Problem (include personal factors and/or comorbidities that impact the POC) s/p right total ankle, POD #1   Existing Precautions/Restrictions weight bearing   Weight-Bearing Status - LUE full weight-bearing   Weight-Bearing Status - RUE full weight-bearing   Weight-Bearing Status - LLE full weight-bearing   Weight-Bearing Status - RLE partial weight-bearing (% in comments)  (50% WB, flat foot WB)   Cognition    Affect/Mental Status (Cognition) WNL   Orientation Status (Cognition) oriented x 4   Follows Commands (Cognition) WNL   Pain Assessment   Patient Currently in Pain Yes, see Vital Sign flowsheet  (2/10 R ankle pain at start of session)   Integumentary/Edema   Integumentary/Edema Comments   (ACE wrap on R LE)   Posture    Posture Not impaired   Range of Motion (ROM)   Range of Motion ROM deficits secondary to surgical procedure   Strength (Manual Muscle Testing)   Strength (Manual Muscle Testing) Deficits observed during functional mobility   Bed Mobility   Comment, (Bed Mobility) Supine to sit w/ SBA   Transfers   Comment, (Transfers) Pt performed STS w/ FWW and CGA   Gait/Stairs (Locomotion)   Comment, (Gait/Stairs) Pt ambulated ~ 10 ft w/ FWW and CGA.   Balance   Balance Comments Good dynamic balance w/ use of FWW   Clinical Impression   Criteria for Skilled Therapeutic Intervention Yes, treatment indicated   PT Diagnosis (PT) Impaired ambulation   Influenced by the following impairments Impaired strength, balance and activity tolerance   Functional limitations due to impairments Impaired ADLs, IADLs and functional mobility   Clinical Presentation (PT Evaluation Complexity) stable   Clinical Presentation Rationale Clinical judgment   Clinical Decision Making (Complexity) low complexity   Planned Therapy Interventions (PT) balance training;bed mobility training;gait training;home exercise program;patient/family education;ROM (range of motion);stair training;strengthening;transfer training;progressive activity/exercise   Risk & Benefits of therapy have been explained evaluation/treatment results reviewed;care plan/treatment goals reviewed;risks/benefits reviewed;current/potential barriers reviewed;participants voiced agreement with care plan;participants included;patient   PT Total Evaluation Time   PT Eval, Low Complexity Minutes (70595) 10   Therapy Certification   Start of care date 10/22/24   Certification date  from 10/22/24   Certification date to 10/27/24   Medical Diagnosis s/p right total ankle, POD #1   Physical Therapy Goals   PT Frequency Daily   PT Predicted Duration/Target Date for Goal Attainment 11/01/24   PT Goals Bed Mobility;Transfers;Gait;Stairs   PT: Bed Mobility Independent;Supine to/from sit;Within precautions;Goal Met   PT: Transfers Modified independent;Assistive device;Within precautions;Sit to/from stand;Goal Met   PT: Gait Modified independent;Greater than 200 feet;Rolling walker;Within precautions;Goal Met   PT: Stairs Supervision/stand-by assist;2 stairs;Assistive device;Within precautions;Goal Met   Interventions   Interventions Quick Adds Gait Training;Therapeutic Activity   Therapeutic Activity   Therapeutic Activities: dynamic activities to improve functional performance Minutes (18517) 15   Symptoms Noted During/After Treatment None   Treatment Detail/Skilled Intervention Pt supine in bed at start of session. Agreeable to therapy. Pt educated on 50% WB, foot flat WB on R LE. Pt educated on proper use of walker and knee scooter throughout today's session. Required CGA during all bed mobility tasks this session. Therapist donned surgical shoe while seated EOB. Good seated balance. Pt performed STS x 2 throughout duration of session w/ use of FWW and CGA. Cueing for sequencing. Pt performed STS x 1 w/ use of knee scooter and CGA. Cueing to lock scooter and for proper R LE placement on device. Pt performed stand to sit x 2 throughout duration of session w/ use of FWW and CGA. Pt performed stand pivot transfer x 1 w/ knee scooter and CGA. Cueing to line up scooter close to recliner and lock brakes before performing transfer. Cueing to kick out R LE during descent. Pt seated in recliner at end of session w/ BLEs elevated. Therapist doffed surgical shoe. Call light in hand. All needs met.   Gait Training   Gait Training Minutes (23929) 15   Symptoms Noted During/After Treatment (Gait Training)  shortness of breath;fatigue   Treatment Detail/Skilled Intervention Pt ambulated ~100 ft x 2 w/ FWW and CGA. Slow glenys and step-to pattern. Reminders to adhere to 50% WB, foot flat WB. Pt able to adhere to WB restrictions. Cueing for tall posture and proper walker managment. Pt required seated rest break following bout of ambulation. No reports of increased R ankle pain during gait training this session. Pt educated on platform stair negotiation w/ use of FWW while adhering to WB restrictions. Pt negotiated 1x2 platform stairs w/ use of FWW and CGA. Cueing for sequencing throughout. No overt LOB. Pt educated on proper use of knee scooter. Pt ambulated ~150 ft x 2 w/ use of knee scooter on R LE. Cueing to slow down during turns. Also cueing for tall posture.   PT Discharge Planning   PT Plan DC   PT Discharge Recommendation (DC Rec)   (Defer to ortho)   PT Rationale for DC Rec Pt below baseline mobility. Ambulating w/ use of FWW and CGA. Transfers and bed mobility tasks are SBA-CGA. Anticipate discharge to home w/ assist and outpatient physical therapy in order to improve upon functional strength and mobility. All acute care PT goals met. Will sign off.   PT Brief overview of current status Goals of therapy will be to address safe mobility and make recs for d/c to next level of care. Pt and RN will continue to follow all falls risk precautions as documented by RN staff while hospitalized.   Total Session Time   Timed Code Treatment Minutes 30   Total Session Time (sum of timed and untimed services) 40

## 2024-10-22 NOTE — PLAN OF CARE
Physical Therapy Discharge Summary    Reason for therapy discharge:    All goals and outcomes met, no further needs identified.    Progress towards therapy goal(s). See goals on Care Plan in [facility] electronic health record for goal details.  Goals met    Therapy recommendation(s):    Continued therapy is recommended.  Rationale/Recommendations: Pt below baseline mobility. Ambulating w/ use of FWW and CGA. Transfers and bed mobility tasks are SBA-CGA. Anticipate discharge to home w/ assist and outpatient physical therapy in order to improve upon functional strength and mobility. All acute care PT goals met. Will sign off.  PT Brief overview of current status: Goals of therapy will be to address safe mobility and make recs for d/c to next level of care. Pt and RN will continue to follow all falls risk precautions as documented by RN staff while hospitalized.          Recommendation above provided by last treating therapist.

## 2024-10-23 LAB — BACTERIA SPEC CULT: NORMAL

## 2024-10-28 NOTE — PROGRESS NOTES
Infection Prevention Progress Note  10/28/2024      Patient Name: Carlos Kingston [id number]  Admit Date: 10/21/2024    Infection Status as of 10/28/2024 9:19 AM: No active infections  Isolation Status as of 10/28/2024 9:19 AM: No active isolations     MDRO Discontinuation  Infection Prevention has reviewed this patient's chart per the MDRO D/C Policy and have taken the following action:    Patient meets all the criteria for discontinuation and Infection Prevention will resolve the MRSA infection status.    Contact Precautions discontinued for the following MDRO(s): MRSA    If you have any questions, please contact Infection Prevention.    Tom Briseno, Infection Prevention

## (undated) DEVICE — DRAIN JACKSON PRATT 10FR ROUND SU130-1321

## (undated) DEVICE — DRSG KERLIX 4 1/2"X4YDS ROLL 6715

## (undated) DEVICE — DRAPE COVER C-ARM SEAMLESS SNAP-KAP 03-KP26 LATEX FREE

## (undated) DEVICE — Device

## (undated) DEVICE — SUCTION TIP YANKAUER W/O VENT K86

## (undated) DEVICE — SU VICRYL 1 CP-1 36" J474H

## (undated) DEVICE — DRAIN CHEST TUBE 28FR STR 8028

## (undated) DEVICE — SYR 50ML LL W/O NDL 309653

## (undated) DEVICE — GLOVE BIOGEL PI MICRO SZ 8.0 48580

## (undated) DEVICE — SPONGE LAP 18X18" X8435

## (undated) DEVICE — SUCTION CANISTER MEDIVAC LINER 3000ML W/LID 65651-530

## (undated) DEVICE — STPL SKIN 35W 6.9MM  PXW35

## (undated) DEVICE — NDL 22GA 1.5"

## (undated) DEVICE — DRAPE IOBAN INCISE 23X17" 6650EZ

## (undated) DEVICE — DRAIN PENROSE 0.75"X18" LATEX FREE GR205

## (undated) DEVICE — CATH PLUG W/CAP 000076

## (undated) DEVICE — SU VICRYL 2-0 CT-2 27" UND J269H

## (undated) DEVICE — BNDG ELASTIC 4" DBL LENGTH UNSTERILE 6611-14

## (undated) DEVICE — SOL NACL 0.9% IRRIG 1000ML BOTTLE 2F7124

## (undated) DEVICE — TOURNIQUET CUFF 30" REPRO BLUE 60-7070-105

## (undated) DEVICE — DRAPE CONVERTORS U-DRAPE 60X72" 8476

## (undated) DEVICE — DRAPE MAYO STAND 23X54 8337

## (undated) DEVICE — DRAIN JACKSON PRATT RESERVOIR 100ML SU130-1305

## (undated) DEVICE — SU VICRYL 2-0 SH 27" UND J417H

## (undated) DEVICE — LABEL MEDICATION SYSTEM 3303-P

## (undated) DEVICE — TAPE DRSG UNIVERSAL CLOTH 3" WHITE LATEX 881-3

## (undated) DEVICE — GLOVE PROTEXIS BLUE W/NEU-THERA 8.0  2D73EB80

## (undated) DEVICE — ENDO VALVE SUCTION BRONCH EVIS MAJ-209

## (undated) DEVICE — SU MONOCRYL 4-0 PS-2 18" UND Y496G

## (undated) DEVICE — SU PROLENE 4-0 V-7DA 36" 8975H

## (undated) DEVICE — GLOVE PROTEXIS W/NEU-THERA 7.5  2D73TE75

## (undated) DEVICE — LINEN ORTHO PACK 5446

## (undated) DEVICE — STPL ENDO ARTICULATING 60MM EC60A

## (undated) DEVICE — BLADE KNIFE SURG 10 371110

## (undated) DEVICE — DRAPE BREAST/CHEST 29420

## (undated) DEVICE — BLADE SAW SAGITTAL STRK 25X90X1.27MM HD SYS 6 6125-127-090

## (undated) DEVICE — DRSG STERI STRIP 1/2X4" R1547

## (undated) DEVICE — PREP CHLORAPREP 26ML TINTED ORANGE  260815

## (undated) DEVICE — TUBING SUCTION 12"X1/4" N612

## (undated) DEVICE — CLIP APPLIER 11" MED LIGACLIP MCM20

## (undated) DEVICE — LINEN TOWEL PACK X5 5464

## (undated) DEVICE — SOL WATER IRRIG 1000ML BOTTLE 2F7114

## (undated) DEVICE — SU VICRYL 2-0 CT-1 27" J339H

## (undated) DEVICE — PACK MINOR SBA15MIFSE

## (undated) DEVICE — ESU PENCIL W/HOLSTER E2350H

## (undated) DEVICE — LINEN GOWN OVERSIZE 5408

## (undated) DEVICE — ESU GROUND PAD UNIVERSAL W/O CORD

## (undated) DEVICE — GOWN IMPERVIOUS ZONED LG

## (undated) DEVICE — SU VICRYL 2-0 CT-1 36" UND J945H

## (undated) DEVICE — SET OF 12 PINS +1 REAMER FOR SALTO ANKLE LJV-529T

## (undated) DEVICE — CATH ON-Q PAIN SILVER SKR 2.5" PM010-A

## (undated) DEVICE — SYR BULB IRRIG 50ML LATEX FREE 0035280

## (undated) DEVICE — SUCTION MANIFOLD NEPTUNE 2 SYS 4 PORT 0702-020-000

## (undated) DEVICE — TUBING SUCTION MEDI-VAC SOFT 3/16"X20' N520A

## (undated) DEVICE — LUBRICANT INST KIT ENDO-LUBE 220-90

## (undated) DEVICE — CAST PADDING 6" STERILE 9046S

## (undated) DEVICE — BLADE KNIFE SURG 15 371115

## (undated) DEVICE — CAST PLASTER SPLINT 5X30" 7395

## (undated) DEVICE — PACK TOTAL KNEE BOXED LATEX FREE PO15TKFCT

## (undated) DEVICE — BNDG ELASTIC 6" DBL LENGTH UNSTERILE 6611-16

## (undated) DEVICE — SYR 20ML LL W/O NDL 302830

## (undated) DEVICE — WIPES FOLEY CARE SURESTEP PROVON DFC100

## (undated) DEVICE — DRAPE IOBAN INCISE 13X13" 6640EZ

## (undated) DEVICE — DRSG GAUZE 4X4" 3033

## (undated) DEVICE — SPONGE RAY-TEC 4X8" 7318

## (undated) DEVICE — SU VICRYL 1 CT 36" J959H

## (undated) DEVICE — SU VICRYL 3-0 PS-1 18" UND J683

## (undated) DEVICE — SURGICEL HEMOSTAT 3X4" NUKNIT 1943

## (undated) DEVICE — PAD ABD 10X8IN DERMACEA ABS NWVN 4 SL EDG SFT LF STRL 7198D

## (undated) DEVICE — BLADE SAW RECIP STRK 70X6X0.025MM 0277-096-250S5

## (undated) DEVICE — ESU ELEC BLADE 6" COATED/INSULATED E1455-6

## (undated) DEVICE — SU PDS II 2-0 CT-2 27"  Z333H

## (undated) DEVICE — SUCTION DRY CHEST DRAIN OASIS 3600-100

## (undated) DEVICE — SU PROLENE 3-0 PS-2 18" 8687H

## (undated) DEVICE — DRILL BIT L135 MM OD3 MM LJV528T

## (undated) DEVICE — DRAPE SHEET REV FOLD 3/4 9349

## (undated) DEVICE — SU SILK 2 REEL 60" SA8H

## (undated) DEVICE — STPL SKIN SUBCUTICULAR INSORB  2030

## (undated) DEVICE — STPL RELOAD REG/THK TISSUE ECHELON 60 X 3.8MM GOLD GST60D

## (undated) DEVICE — PACK EXTREMITY SOP15EXFSD

## (undated) DEVICE — SU NDL CUT REV MED 3/8 209014

## (undated) DEVICE — SU VICRYL 1 CTX CR 8X18" J765D

## (undated) DEVICE — ESU PENCIL W/SMOKE EVAC NEPTUNE STRYKER 0703-046-000

## (undated) DEVICE — IMM LIMB ELEVATOR DC40-0203

## (undated) DEVICE — GLOVE PROTEXIS BLUE W/NEU-THERA 7.5  2D73EB75

## (undated) DEVICE — PREP CHLORAPREP 26ML TINTED HI-LITE ORANGE 930815

## (undated) DEVICE — SU PROLENE 3-0 V-7DA 36" 8976H

## (undated) DEVICE — STPL POWERED ECHELON VASC 35MM PVE35A

## (undated) DEVICE — DRSG KERLIX 4 1/2"X4YDS ROLL 6730

## (undated) DEVICE — BLADE CLIPPER 4406

## (undated) DEVICE — DRILL BIT TORNIER 3MM LONG DWD060

## (undated) DEVICE — DRAPE POUCH INSTRUMENT 1018

## (undated) DEVICE — DRAPE EXTREMITY BILAT

## (undated) DEVICE — BONE CEMENT MIXEVAC III HI VAC KIT  0206-015-000

## (undated) DEVICE — GLOVE PROTEXIS W/NEU-THERA 6.5  2D73TE65

## (undated) DEVICE — BLADE SAW LG BONE TORNIER 70X13X1.27MM SAW6944T

## (undated) DEVICE — GLOVE PROTEXIS POWDER FREE 7.0 ORTHOPEDIC 2D73ET70

## (undated) DEVICE — POSITIONER ARMBOARD FOAM 1PAIR LF FP-ARMB1

## (undated) DEVICE — TOURNIQUET SGL  BLADDER 30"X4" BLUE 5921030135

## (undated) DEVICE — CATH TRAY FOLEY SURESTEP 16FR WDRAIN BAG STLK LATEX A300316A

## (undated) DEVICE — DECANTER BAG 2002S

## (undated) DEVICE — DRAPE STOCKINETTE IMPERVIOUS 12" 1587

## (undated) DEVICE — DRSG AQUACEL AG 3.5X9.75" HYDROFIBER 412011

## (undated) DEVICE — LINEN ORTHO ACL PACK 5447

## (undated) DEVICE — BAG CLEAR TRASH 1.3M 39X33" P4040C

## (undated) DEVICE — BNDG ELASTIC 6"X5YDS STERILE 6611-6S

## (undated) DEVICE — LINEN GOWN X4 5410

## (undated) DEVICE — TUBING SUCTION MEDI-VAC 1/4"X20' N620A

## (undated) DEVICE — BLADE SAW SAGITTAL STRK 18X90X1.27MM HD SYS 6 6118-127-090

## (undated) DEVICE — LINEN FULL SHEET 5511

## (undated) DEVICE — ENDO VALVE BX EVIS MAJ-210

## (undated) DEVICE — PAD CHUX UNDERPAD 30X36" P3036C

## (undated) DEVICE — SUCTION MANIFOLD DORNOCH ULTRA CART UL-CL500

## (undated) DEVICE — TUBE SMOKE EVAC 7/8"X10 (STERILE)

## (undated) DEVICE — GLOVE PROTEXIS POWDER FREE 7.5 ORTHOPEDIC 2D73ET75

## (undated) DEVICE — ADPT 5 IN 1 360

## (undated) DEVICE — DRAPE LAP TRANSVERSE 29421

## (undated) DEVICE — SU ETHILON 2-0 PS 18" 585H

## (undated) DEVICE — SET HANDPIECE INTERPULSE W/COAXIAL FAN SPRAY TIP 0210118000

## (undated) DEVICE — CAST PADDING 4" UNSTERILE 9044

## (undated) DEVICE — RETR PENILE WILSON XL 12"  TLC-5042-M

## (undated) RX ORDER — EPINEPHRINE 1 MG/ML
INJECTION, SOLUTION INTRAMUSCULAR; SUBCUTANEOUS
Status: DISPENSED
Start: 2022-08-29

## (undated) RX ORDER — MORPHINE SULFATE 1 MG/ML
INJECTION, SOLUTION EPIDURAL; INTRATHECAL; INTRAVENOUS
Status: DISPENSED
Start: 2019-05-06

## (undated) RX ORDER — BUPIVACAINE HYDROCHLORIDE 5 MG/ML
INJECTION, SOLUTION EPIDURAL; INTRACAUDAL
Status: DISPENSED
Start: 2020-08-04

## (undated) RX ORDER — PROPOFOL 10 MG/ML
INJECTION, EMULSION INTRAVENOUS
Status: DISPENSED
Start: 2019-05-06

## (undated) RX ORDER — HYDROMORPHONE HCL IN WATER/PF 6 MG/30 ML
PATIENT CONTROLLED ANALGESIA SYRINGE INTRAVENOUS
Status: DISPENSED
Start: 2022-07-01

## (undated) RX ORDER — DEXAMETHASONE SODIUM PHOSPHATE 4 MG/ML
INJECTION, SOLUTION INTRA-ARTICULAR; INTRALESIONAL; INTRAMUSCULAR; INTRAVENOUS; SOFT TISSUE
Status: DISPENSED
Start: 2024-10-21

## (undated) RX ORDER — PROPOFOL 10 MG/ML
INJECTION, EMULSION INTRAVENOUS
Status: DISPENSED
Start: 2022-07-01

## (undated) RX ORDER — CEFAZOLIN SODIUM 2 G/100ML
INJECTION, SOLUTION INTRAVENOUS
Status: DISPENSED
Start: 2020-08-04

## (undated) RX ORDER — HYDROMORPHONE HYDROCHLORIDE 1 MG/ML
INJECTION, SOLUTION INTRAMUSCULAR; INTRAVENOUS; SUBCUTANEOUS
Status: DISPENSED
Start: 2020-08-04

## (undated) RX ORDER — VANCOMYCIN HYDROCHLORIDE 1 G/20ML
INJECTION, POWDER, LYOPHILIZED, FOR SOLUTION INTRAVENOUS
Status: DISPENSED
Start: 2020-08-04

## (undated) RX ORDER — ACETAMINOPHEN 325 MG/1
TABLET ORAL
Status: DISPENSED
Start: 2020-08-04

## (undated) RX ORDER — CEFAZOLIN SODIUM 1 G/3ML
INJECTION, POWDER, FOR SOLUTION INTRAMUSCULAR; INTRAVENOUS
Status: DISPENSED
Start: 2019-05-06

## (undated) RX ORDER — PROPOFOL 10 MG/ML
INJECTION, EMULSION INTRAVENOUS
Status: DISPENSED
Start: 2024-10-21

## (undated) RX ORDER — PROPOFOL 10 MG/ML
INJECTION, EMULSION INTRAVENOUS
Status: DISPENSED
Start: 2020-08-04

## (undated) RX ORDER — CEFAZOLIN SODIUM 1 G/3ML
INJECTION, POWDER, FOR SOLUTION INTRAMUSCULAR; INTRAVENOUS
Status: DISPENSED
Start: 2022-07-01

## (undated) RX ORDER — CEFAZOLIN SODIUM 2 G/100ML
INJECTION, SOLUTION INTRAVENOUS
Status: DISPENSED
Start: 2019-05-06

## (undated) RX ORDER — ONDANSETRON 2 MG/ML
INJECTION INTRAMUSCULAR; INTRAVENOUS
Status: DISPENSED
Start: 2024-10-21

## (undated) RX ORDER — FENTANYL CITRATE 50 UG/ML
INJECTION, SOLUTION INTRAMUSCULAR; INTRAVENOUS
Status: DISPENSED
Start: 2020-08-04

## (undated) RX ORDER — FENTANYL CITRATE 50 UG/ML
INJECTION, SOLUTION INTRAMUSCULAR; INTRAVENOUS
Status: DISPENSED
Start: 2022-07-01

## (undated) RX ORDER — FENTANYL CITRATE 50 UG/ML
INJECTION, SOLUTION INTRAMUSCULAR; INTRAVENOUS
Status: DISPENSED
Start: 2024-10-21

## (undated) RX ORDER — FENTANYL CITRATE 50 UG/ML
INJECTION, SOLUTION INTRAMUSCULAR; INTRAVENOUS
Status: DISPENSED
Start: 2022-08-29

## (undated) RX ORDER — PROPOFOL 10 MG/ML
INJECTION, EMULSION INTRAVENOUS
Status: DISPENSED
Start: 2022-08-29

## (undated) RX ORDER — FENTANYL CITRATE 0.05 MG/ML
INJECTION, SOLUTION INTRAMUSCULAR; INTRAVENOUS
Status: DISPENSED
Start: 2022-07-01

## (undated) RX ORDER — ONDANSETRON 2 MG/ML
INJECTION INTRAMUSCULAR; INTRAVENOUS
Status: DISPENSED
Start: 2020-08-04

## (undated) RX ORDER — LIDOCAINE HYDROCHLORIDE 20 MG/ML
INJECTION, SOLUTION EPIDURAL; INFILTRATION; INTRACAUDAL; PERINEURAL
Status: DISPENSED
Start: 2020-08-04

## (undated) RX ORDER — CEFAZOLIN SODIUM/WATER 2 G/20 ML
SYRINGE (ML) INTRAVENOUS
Status: DISPENSED
Start: 2024-10-21

## (undated) RX ORDER — FENTANYL CITRATE-0.9 % NACL/PF 10 MCG/ML
PLASTIC BAG, INJECTION (ML) INTRAVENOUS
Status: DISPENSED
Start: 2020-08-04

## (undated) RX ORDER — HYDROMORPHONE HYDROCHLORIDE 1 MG/ML
INJECTION, SOLUTION INTRAMUSCULAR; INTRAVENOUS; SUBCUTANEOUS
Status: DISPENSED
Start: 2022-07-01

## (undated) RX ORDER — DEXAMETHASONE SODIUM PHOSPHATE 4 MG/ML
INJECTION, SOLUTION INTRA-ARTICULAR; INTRALESIONAL; INTRAMUSCULAR; INTRAVENOUS; SOFT TISSUE
Status: DISPENSED
Start: 2020-08-04

## (undated) RX ORDER — BUPIVACAINE HYDROCHLORIDE 5 MG/ML
INJECTION, SOLUTION EPIDURAL; INTRACAUDAL
Status: DISPENSED
Start: 2022-07-01

## (undated) RX ORDER — FENTANYL CITRATE 50 UG/ML
INJECTION, SOLUTION INTRAMUSCULAR; INTRAVENOUS
Status: DISPENSED
Start: 2019-05-06